# Patient Record
Sex: FEMALE | Race: WHITE | NOT HISPANIC OR LATINO | Employment: OTHER | URBAN - METROPOLITAN AREA
[De-identification: names, ages, dates, MRNs, and addresses within clinical notes are randomized per-mention and may not be internally consistent; named-entity substitution may affect disease eponyms.]

---

## 2017-04-10 ENCOUNTER — HISTORICAL (OUTPATIENT)
Dept: LAB | Facility: HOSPITAL | Age: 77
End: 2017-04-10

## 2017-05-15 ENCOUNTER — HISTORICAL (OUTPATIENT)
Dept: LAB | Facility: HOSPITAL | Age: 77
End: 2017-05-15

## 2017-05-15 LAB
ALBUMIN SERPL-MCNC: 3.4 GM/DL (ref 3.4–5)
ALBUMIN/GLOB SERPL: 1 RATIO (ref 1.1–2)
ALP SERPL-CCNC: 100 UNIT/L (ref 50–136)
ALT SERPL-CCNC: 20 UNIT/L (ref 12–78)
AST SERPL-CCNC: 15 UNIT/L (ref 10–37)
BILIRUB SERPL-MCNC: 0.5 MG/DL (ref 0.2–1)
BILIRUBIN DIRECT+TOT PNL SERPL-MCNC: 0.18 MG/DL (ref 0.05–0.2)
BILIRUBIN DIRECT+TOT PNL SERPL-MCNC: 0.32 MG/DL
BUN SERPL-MCNC: 28 MG/DL (ref 7–18)
CALCIUM SERPL-MCNC: 8.7 MG/DL (ref 8.5–10.1)
CHLORIDE SERPL-SCNC: 105 MMOL/L (ref 98–107)
CO2 SERPL-SCNC: 31.7 MMOL/L (ref 21–32)
CREAT SERPL-MCNC: 0.97 MG/DL (ref 0.55–1.02)
GLOBULIN SER-MCNC: 3.4 GM/DL (ref 2.4–3.5)
GLUCOSE SERPL-MCNC: 111 MG/DL (ref 74–106)
POTASSIUM SERPL-SCNC: 3.4 MMOL/L (ref 3.5–5.1)
PROT SERPL-MCNC: 6.8 GM/DL (ref 6.4–8.2)
SODIUM SERPL-SCNC: 143 MMOL/L (ref 136–145)

## 2017-11-13 ENCOUNTER — HISTORICAL (OUTPATIENT)
Dept: LAB | Facility: HOSPITAL | Age: 77
End: 2017-11-13

## 2017-12-21 ENCOUNTER — HISTORICAL (OUTPATIENT)
Dept: LAB | Facility: HOSPITAL | Age: 77
End: 2017-12-21

## 2018-04-11 ENCOUNTER — HISTORICAL (OUTPATIENT)
Dept: ADMINISTRATIVE | Facility: HOSPITAL | Age: 78
End: 2018-04-11

## 2018-05-07 ENCOUNTER — HISTORICAL (OUTPATIENT)
Dept: LAB | Facility: HOSPITAL | Age: 78
End: 2018-05-07

## 2018-10-03 ENCOUNTER — HISTORICAL (OUTPATIENT)
Dept: ADMINISTRATIVE | Facility: HOSPITAL | Age: 78
End: 2018-10-03

## 2018-11-12 ENCOUNTER — HISTORICAL (OUTPATIENT)
Dept: LAB | Facility: HOSPITAL | Age: 78
End: 2018-11-12

## 2019-05-10 ENCOUNTER — HISTORICAL (OUTPATIENT)
Dept: RADIOLOGY | Facility: HOSPITAL | Age: 79
End: 2019-05-10

## 2019-05-31 ENCOUNTER — HISTORICAL (OUTPATIENT)
Dept: LAB | Facility: HOSPITAL | Age: 79
End: 2019-05-31

## 2019-06-17 ENCOUNTER — HISTORICAL (OUTPATIENT)
Dept: RADIOLOGY | Facility: HOSPITAL | Age: 79
End: 2019-06-17

## 2019-11-21 ENCOUNTER — HISTORICAL (OUTPATIENT)
Dept: LAB | Facility: HOSPITAL | Age: 79
End: 2019-11-21

## 2019-12-02 ENCOUNTER — HISTORICAL (OUTPATIENT)
Dept: LAB | Facility: HOSPITAL | Age: 79
End: 2019-12-02

## 2019-12-10 ENCOUNTER — HISTORICAL (OUTPATIENT)
Dept: RADIOLOGY | Facility: HOSPITAL | Age: 79
End: 2019-12-10

## 2020-03-04 ENCOUNTER — HISTORICAL (OUTPATIENT)
Dept: LAB | Facility: HOSPITAL | Age: 80
End: 2020-03-04

## 2020-03-17 ENCOUNTER — HISTORICAL (OUTPATIENT)
Dept: LAB | Facility: HOSPITAL | Age: 80
End: 2020-03-17

## 2020-07-06 ENCOUNTER — HISTORICAL (OUTPATIENT)
Dept: LAB | Facility: HOSPITAL | Age: 80
End: 2020-07-06

## 2020-09-08 ENCOUNTER — HISTORICAL (OUTPATIENT)
Dept: LAB | Facility: HOSPITAL | Age: 80
End: 2020-09-08

## 2021-02-24 ENCOUNTER — HISTORICAL (OUTPATIENT)
Dept: ADMINISTRATIVE | Facility: HOSPITAL | Age: 81
End: 2021-02-24

## 2021-03-02 ENCOUNTER — HISTORICAL (OUTPATIENT)
Dept: OCCUPATIONAL THERAPY | Facility: HOSPITAL | Age: 81
End: 2021-03-02

## 2021-03-03 ENCOUNTER — HISTORICAL (OUTPATIENT)
Dept: RADIOLOGY | Facility: HOSPITAL | Age: 81
End: 2021-03-03

## 2021-03-04 ENCOUNTER — HISTORICAL (OUTPATIENT)
Dept: OCCUPATIONAL THERAPY | Facility: HOSPITAL | Age: 81
End: 2021-03-04

## 2021-03-10 ENCOUNTER — HISTORICAL (OUTPATIENT)
Dept: OCCUPATIONAL THERAPY | Facility: HOSPITAL | Age: 81
End: 2021-03-10

## 2021-03-12 ENCOUNTER — HISTORICAL (OUTPATIENT)
Dept: OCCUPATIONAL THERAPY | Facility: HOSPITAL | Age: 81
End: 2021-03-12

## 2021-06-16 ENCOUNTER — HISTORICAL (OUTPATIENT)
Dept: ADMINISTRATIVE | Facility: HOSPITAL | Age: 81
End: 2021-06-16

## 2021-07-06 ENCOUNTER — HISTORICAL (OUTPATIENT)
Dept: OCCUPATIONAL THERAPY | Facility: HOSPITAL | Age: 81
End: 2021-07-06

## 2021-07-08 ENCOUNTER — HISTORICAL (OUTPATIENT)
Dept: OCCUPATIONAL THERAPY | Facility: HOSPITAL | Age: 81
End: 2021-07-08

## 2021-07-12 ENCOUNTER — HISTORICAL (OUTPATIENT)
Dept: OCCUPATIONAL THERAPY | Facility: HOSPITAL | Age: 81
End: 2021-07-12

## 2021-07-14 ENCOUNTER — HISTORICAL (OUTPATIENT)
Dept: OCCUPATIONAL THERAPY | Facility: HOSPITAL | Age: 81
End: 2021-07-14

## 2021-07-16 ENCOUNTER — HISTORICAL (OUTPATIENT)
Dept: OCCUPATIONAL THERAPY | Facility: HOSPITAL | Age: 81
End: 2021-07-16

## 2021-07-16 ENCOUNTER — HISTORICAL (OUTPATIENT)
Dept: ADMINISTRATIVE | Facility: HOSPITAL | Age: 81
End: 2021-07-16

## 2021-07-19 ENCOUNTER — HISTORICAL (OUTPATIENT)
Dept: OCCUPATIONAL THERAPY | Facility: HOSPITAL | Age: 81
End: 2021-07-19

## 2021-07-21 ENCOUNTER — HISTORICAL (OUTPATIENT)
Dept: OCCUPATIONAL THERAPY | Facility: HOSPITAL | Age: 81
End: 2021-07-21

## 2021-07-23 ENCOUNTER — HISTORICAL (OUTPATIENT)
Dept: OCCUPATIONAL THERAPY | Facility: HOSPITAL | Age: 81
End: 2021-07-23

## 2021-07-26 ENCOUNTER — HISTORICAL (OUTPATIENT)
Dept: OCCUPATIONAL THERAPY | Facility: HOSPITAL | Age: 81
End: 2021-07-26

## 2021-07-28 ENCOUNTER — HISTORICAL (OUTPATIENT)
Dept: OCCUPATIONAL THERAPY | Facility: HOSPITAL | Age: 81
End: 2021-07-28

## 2021-07-30 ENCOUNTER — HISTORICAL (OUTPATIENT)
Dept: OCCUPATIONAL THERAPY | Facility: HOSPITAL | Age: 81
End: 2021-07-30

## 2021-08-02 ENCOUNTER — HISTORICAL (OUTPATIENT)
Dept: OCCUPATIONAL THERAPY | Facility: HOSPITAL | Age: 81
End: 2021-08-02

## 2021-08-04 ENCOUNTER — HISTORICAL (OUTPATIENT)
Dept: OCCUPATIONAL THERAPY | Facility: HOSPITAL | Age: 81
End: 2021-08-04

## 2021-08-06 ENCOUNTER — HISTORICAL (OUTPATIENT)
Dept: OCCUPATIONAL THERAPY | Facility: HOSPITAL | Age: 81
End: 2021-08-06

## 2021-08-09 ENCOUNTER — HISTORICAL (OUTPATIENT)
Dept: OCCUPATIONAL THERAPY | Facility: HOSPITAL | Age: 81
End: 2021-08-09

## 2021-08-11 ENCOUNTER — HISTORICAL (OUTPATIENT)
Dept: OCCUPATIONAL THERAPY | Facility: HOSPITAL | Age: 81
End: 2021-08-11

## 2021-08-13 ENCOUNTER — HISTORICAL (OUTPATIENT)
Dept: OCCUPATIONAL THERAPY | Facility: HOSPITAL | Age: 81
End: 2021-08-13

## 2021-08-16 ENCOUNTER — HISTORICAL (OUTPATIENT)
Dept: OCCUPATIONAL THERAPY | Facility: HOSPITAL | Age: 81
End: 2021-08-16

## 2021-08-17 ENCOUNTER — HISTORICAL (OUTPATIENT)
Dept: LAB | Facility: HOSPITAL | Age: 81
End: 2021-08-17

## 2021-08-18 ENCOUNTER — HISTORICAL (OUTPATIENT)
Dept: OCCUPATIONAL THERAPY | Facility: HOSPITAL | Age: 81
End: 2021-08-18

## 2021-08-20 ENCOUNTER — HISTORICAL (OUTPATIENT)
Dept: OCCUPATIONAL THERAPY | Facility: HOSPITAL | Age: 81
End: 2021-08-20

## 2021-08-20 LAB — FINAL CULTURE: NO GROWTH

## 2021-08-23 ENCOUNTER — HISTORICAL (OUTPATIENT)
Dept: OCCUPATIONAL THERAPY | Facility: HOSPITAL | Age: 81
End: 2021-08-23

## 2021-08-25 ENCOUNTER — HISTORICAL (OUTPATIENT)
Dept: OCCUPATIONAL THERAPY | Facility: HOSPITAL | Age: 81
End: 2021-08-25

## 2022-02-01 ENCOUNTER — HISTORICAL (OUTPATIENT)
Dept: LAB | Facility: HOSPITAL | Age: 82
End: 2022-02-01

## 2022-02-01 LAB
APPEARANCE, UA: NORMAL
BACTERIA SPEC CULT: NORMAL
BILIRUB UR QL STRIP: NEGATIVE
COLOR UR: YELLOW
GLUCOSE (UA): NEGATIVE
HGB UR QL STRIP: NORMAL
KETONES UR QL STRIP: NEGATIVE
LEUKOCYTE ESTERASE UR QL STRIP: NORMAL
NITRITE UR QL STRIP: POSITIVE
PH UR STRIP: 7.5 [PH] (ref 4.6–8)
PROT UR QL STRIP: NORMAL
RBC #/AREA URNS HPF: NORMAL /[HPF]
SP GR UR STRIP: 1.02 (ref 1–1.03)
SQUAMOUS EPITHELIAL, UA: NORMAL
UROBILINOGEN UR STRIP-ACNC: 0.2
WBC #/AREA URNS HPF: NORMAL /[HPF] (ref 0–3)

## 2022-03-08 ENCOUNTER — HISTORICAL (OUTPATIENT)
Dept: ADMINISTRATIVE | Facility: HOSPITAL | Age: 82
End: 2022-03-08

## 2022-03-08 ENCOUNTER — HISTORICAL (OUTPATIENT)
Dept: RADIOLOGY | Facility: HOSPITAL | Age: 82
End: 2022-03-08

## 2022-04-09 ENCOUNTER — HISTORICAL (OUTPATIENT)
Dept: ADMINISTRATIVE | Facility: HOSPITAL | Age: 82
End: 2022-04-09
Payer: MEDICARE

## 2022-04-25 VITALS
WEIGHT: 205 LBS | OXYGEN SATURATION: 95 % | SYSTOLIC BLOOD PRESSURE: 128 MMHG | BODY MASS INDEX: 30.36 KG/M2 | HEIGHT: 69 IN | DIASTOLIC BLOOD PRESSURE: 78 MMHG

## 2022-05-02 NOTE — HISTORICAL [facility]ER
This is a historical note converted from Becky. Formatting and pictures may have been removed.  Please reference Cerfernando for original formatting and attached multimedia. Chief Complaint  Pt is here for a follow up on left knee pain. Pt c/o right hip pain. Pt stated the pain has been going on fro a few months. Pt stated she takes tylenol and Ibuprofen for pain. She stated she has been doing sari for a few months.  History of Present Illness  Laura comes in today for evaluation of her left knee osteoarthritis and right hip pain..?She said?prior cortisone injections in both knees which have helped. Her last left knee injection was 6 months ago. She is developing some pain and swelling to the?posterior aspect of her knee with occasional lateral pain.?She is also complaining of lateral sided right hip pain. She denies any groin pain.?This hip pain is only been going on for the last month as she feels she is over compensating for the left side.?She denies any history of injuries.?She takes Tylenol and ibuprofen which helped.  Review of Systems  Systemic: No fever, no chills, and no recent weight change.  Head: No headache - frequent.  Eyes: No vision problems.  Otolarnygeal: No hearing loss, no earache, no epistaxis, no hoarseness, and no tooth pain. Gums normal.  Cardiovascular: No chest pain or discomfort and no palpitations.  Pulmonary: No pulmonary symptoms - no dyspnea, no shortness of breath  Gastrointestinal: Appetite not decreased. No dysphagia and no constant eructation. No nausea, no vomiting, no abdominal pain, no hematochezia.  Genitourinary: No genitourinary symptoms - No urinary hesitancy. No urinary loss of control - no burning sensation during urination.  Musculoskeletal: No calf muscle cramps and no localized soft tissue swelling  Neurological: No fainting and no convulsions.  Psychological: no depression.  Skin: No rash.  Physical Exam  Vitals & Measurements  BP:?122/60?  HT:?176?cm? HT:?176?cm?  WT:?92.98?kg? WT:?92.98?kg? BMI:?30.02?  PHYSICAL FINDINGS  Cardiovascular:  Arterial Pulses: Posterior tibialis pulses were normal Left. Dorsalis pedis pulses were normal Left.  Musculoskeletal System:  Thigh:  Left Thigh: Thigh showed quadriceps atrophy.  Knee:  Left Knee: Examined.  Right Knee:  Grade in the knee: Value  Grade effusion 1  Genu varum. Patella demonstrated crepitus. Anteromedial aspect was tender on palpation. Medial aspect was tender on palpation. Medial collateral ligament was tender on palpation. Active motion.  Right Knee:  Knee Motion: Value?  Active flexion 120 degrees  Active extension 5 degrees  Pain was elicited by flexion. No erythema. No warmth. No medial instability. No lateral instability. No one plane medial (straight) instability. No one plane lateral (straight) instability. A Lachman test did not demonstrate one plane anterior instability.  Neurological:  Gait And Stance: A Left-sided antalgic gait was observed.  ?  After verbal consent left knee was prepped in sterile fashion. 2 mL of lidocaine and 2 mL of betamethasone was injected intra-articularly on a 25-gauge needle. Patient tolerated the procedure well  ?  ?  ?  right hip exam  No signs of effusion, erythema, increased heat  She does have tenderness to palpation of the greater trochanteric bursa but no groin tenderness  She has active?hip flexion 100?  Active hip internal rotation 10?  Active hip external rotation 40?  Active hip abduction 30?  No pain with internal or external rotation of the right hip  Good resistance against abductor?strength  Sensation intact distally  Pedal pulses 2+  ?  Right hip radiographs taken office today show very mild joint space narrowing?with minor marginal osteophytes  ?  Assessment/Plan  1.?Primary osteoarthritis of left knee  ? recommend cortisone injection to the left knee today, continued Tylenol versus ibuprofen, home exercises and ice as needed for pain.  Ordered:  betamethasone, 12 mg,  Intra-Articular, Once, first dose 10/03/18 11:00:00 CDT, stop date 10/03/18 11:00:00 CDT  Lidocaine inj., 2 mL, Intra-Articular, Once, first dose 10/03/18 10:13:00 CDT, stop date 10/03/18 10:13:00 CDT  asp/inj jnt/bursa, major  PC, 10/03/18 10:13:00 CDT, [facility], Routine, 10/03/18 10:13:00 CDT  ?  2.?Primary osteoarthritis of right hip  ? home exercise program.?If her lateral sided?right hip pain persist and we discussed a?trochanteric bursa injection in the future.  ?  3.?Trochanteric bursitis, right hip  ?  Orders:  Clinic Follow-up PRN, 10/03/18 10:13:00 CDT, Future Order, [facility]   Problem List/Past Medical History  Ongoing  Hyperlipidemia  Morbid obesity  Overactive bladder  Primary osteoarthritis of left knee  Primary osteoarthritis of right hip  Trochanteric bursitis, right hip  Historical  No qualifying data  Procedure/Surgical History  Appendectomy  Bladder operation   section  Hysterectomy  ORIF - Open reduction and internal fixation of fracture  Tonsillectomy   Medications  amlodipine 10 mg oral tablet, 10 mg= 1 tab(s), Oral, Daily  Aspirin Low Dose 81 mg oral delayed release tablet, 81 mg= 1 tab(s), Oral, Daily  Celestone, 12 mg, Intra-Articular, Once  hydrochlorothiazide-triamterene 25 mg-50 mg oral capsule, 1 cap(s), Oral, Daily  lidocaine 2% injectable solution, 2 mL, Intra-Articular, Once  lovastatin 40 mg oral tablet, 40 mg= 1 tab(s), Oral, Daily, 3 refills  meloxicam 7.5 mg oral tablet, 7.5 mg= 1 tab(s), Oral, Daily, 3 refills  oxybutynin 5 mg oral tablet, 5 mg= 1 tab(s), Oral, BID, 3 refills  Allergies  No Known Allergies  Social History  Alcohol  Current, Wine, 1-2 times per week, 2018  Employment/School  Retired, 2018  Home/Environment  Lives with Alone., 2018  Tobacco  Never smoker, 2016  Family History  Family history is negative  Immunizations  Vaccine Date Status   influenza virus vaccine, inactivated 2016 [place]   Health  Maintenance  Health Maintenance  ???Pending?(in the next year)  ??? ??OverDue  ??? ? ? ?Bone Density Screening due??05/09/18??and every 2??year(s)  ??? ??Due?  ??? ? ? ?ADL Screening due??10/03/18??and every 1??year(s)  ??? ? ? ?Advance Directive due??10/03/18??and every 1??year(s)  ??? ? ? ?Cognitive Screening due??10/03/18??and every 1??year(s)  ??? ? ? ?Functional Assessment due??10/03/18??and every 1??year(s)  ??? ? ? ?Geriatric Depression Screening due??10/03/18??and every 1??year(s)  ??? ? ? ?Pneumococcal Vaccine due??10/03/18??Variable frequency  ??? ? ? ?Pneumococcal Vaccine due??10/03/18??and every?  ??? ? ? ?Tetanus Vaccine due??10/03/18??and every 10??year(s)  ??? ? ? ?Zoster Vaccine due??10/03/18??and every 100??year(s)  ??? ??Due In Future?  ??? ? ? ?Hypertension Management-BMP not due until??07/13/19??and every 1??year(s)  ???Satisfied?(in the past 1 year)  ??? ??Satisfied?  ??? ? ? ?Blood Pressure Screening on??10/03/18.??Satisfied by Libby Rader LPN  ??? ? ? ?Body Mass Index Check on??10/03/18.??Satisfied by Libby Rader LPN  ??? ? ? ?Depression Screening on??10/03/18.??Satisfied by Libby Rader LPN  ??? ? ? ?Diabetes Screening on??05/07/18.??Satisfied by Tonya Hancock MD  ??? ? ? ?Fall Risk Assessment on??10/03/18.??Satisfied by Libby Rader LPN  ??? ? ? ?Hypertension Management-Blood Pressure on??10/03/18.??Satisfied by Libby Rader LPN  ??? ? ? ?Influenza Vaccine on??10/03/18.??Satisfied by Libby Rader LPN  ??? ? ? ?Lipid Screening on??05/07/18.??Satisfied by Tonya Hancock MD  ??? ? ? ?Obesity Screening on??10/03/18.??Satisfied by Libby Rader LPN  ?  ?

## 2022-05-02 NOTE — HISTORICAL [facility]
This is a historical note converted from Becky. Formatting and pictures may have been removed.  Please reference Cerfernando for original formatting and attached multimedia. Chief Complaint  left knee pain. pt states she is having trouble getting in and out of her vehicle. pain has been there for awhile.  History of Present Illness  80-year-old female presents office today for evaluation of her left knee pain. ?She does have a known history of osteoarthritis.? She has received cortisone injections in the past which have helped. ?Her pain is medial sided and?worse with getting from a seated to a standing position. ?She does admit to some swelling. ?Denies any history of injury. ?She does have a history of chronic kidney disease stage III. ?Followed by?medicine.? She refrains from taking NSAIDs.  Review of Systems  Systemic: No fever, no chills, and no recent weight change.  Head: No headache - frequent.  Eyes: No vision problems.  Otolarnygeal: No hearing loss, no earache, no epistaxis, no hoarseness, and no tooth pain. Gums normal.  Cardiovascular: No chest pain or discomfort and no palpitations.  Pulmonary: No pulmonary symptoms - no dyspnea, no shortness of breath  Gastrointestinal: Appetite not decreased. No dysphagia and no constant eructation. No nausea, no vomiting, no abdominal pain, no hematochezia.  Genitourinary: No genitourinary symptoms - No urinary hesitancy. No urinary loss of control - no burning sensation during urination.  Musculoskeletal: No calf muscle cramps and no localized soft tissue swelling  Neurological: No fainting and no convulsions.  Psychological: no depression.  Skin: No rash.  Physical Exam  Vitals & Measurements  T:?97.6? ?F (Oral)? BP:?126/69?  HT:?175.00?cm? WT:?90.800?kg? BMI:?29.65?  PHYSICAL FINDINGS  Cardiovascular:  Arterial Pulses: Posterior tibialis pulses were normal. Dorsalis pedis pulses were normal left.  Musculoskeletal System:  Thigh:  ?Thigh: Thigh showed quadriceps  atrophy.  Knee:  left?Knee:  Grade effusion 1  Genu varum. Patella demonstrated crepitus. Anteromedial aspect was tender on palpation. Medial aspect was tender on palpation. Medial collateral ligament was tender on palpation. Active motion.  left?Knee:  Knee Motion: Value  Active flexion?120 degrees  Active extension?10 degrees  Pain was elicited by flexion. No erythema. No warmth. No medial instability. No lateral instability. No one plane medial (straight) instability. No one plane lateral (straight) instability. A Lachman test did not demonstrate one plane anterior instability.  Neurological:  Gait And Stance: A left-sided antalgic gait was observed.  ???  ???  TESTS  Imaging:  X-Ray Knee:  A complete knee x-ray with standing views was performed -of?left knee.  AP and lateral view x-rays of the Right knee with sunrise view of the patella were performed -of?left knee.  ???  ???  IMPRESSIONS RADIOLOGY TEST  Advanced Narrowing of the joint space? in medial and patellofemoral compartments, and osteophytes arising from the?left knee.  Kellgren Brayan grade 4 changes  ?   After verbal consent?left knee was prepped in sterile fashion. 2 mL of lidocaine and 2 mL of betamethasone was injected intra-articularly on a 25-gauge needle. Patient tolerated the procedure well.  Assessment/Plan  1.?Primary osteoarthritis of left knee?M17.12  ?Patient would like to try corticosteroid injection?again today?and we will initiate physical therapy program. ?We have discussed robotic assisted left total knee arthroplasty?which she is seriously contemplating in the next couple of months.? If she wishes to proceed on with the surgery then we will?request medical clearance?prior. ?She will follow-up with us as needed  Ordered:  betamethasone, 12 mg, Intra-Articular, Once, first dose 02/24/21 9:00:00 CST, stop date 02/24/21 9:00:00 CST  Lidocaine inj., 2 mL, Intra-Articular, Once, first dose 02/24/21 8:29:00 CST, stop date 02/24/21 8:29:00  CST  asp/inj jnt/bursa, major  PC, 21 8:29:00 CST, [facility], Routine, 21 8:29:00 CST, Primary osteoarthritis of left knee  Clinic Follow-up PRN, 21 8:29:00 CST, Future Order, [facility]  ?  2.?CKD (chronic kidney disease) stage 3, GFR 30-59 ml/min?N18.3  ?  Referrals  Clinic Follow-up PRN, 21 8:29:00 CST, Future Order, [facility]   Problem List/Past Medical History  Ongoing  CKD (chronic kidney disease) stage 3, GFR 30-59 ml/min  Coronary arteriosclerosis  HTN (hypertension)  Hyperlipidemia  Osteopenia  Overactive bladder  Personal history of breast cancer  Prediabetes  Primary osteoarthritis of left knee  Primary osteoarthritis of right hip  Trochanteric bursitis, right hip  Historical  Morbid obesity  Procedure/Surgical History  Mammogram (12/10/2019)  Mammogram (Week of 2018)  Colonoscopy (2017)  Open reduction of fracture of radius with internal fixation ()  Suspension of bladder ()  Appendectomy ()  Hysterectomy ()   section ()   section ()  Tonsillectomy ()   Medications  amlodipine 5 mg oral tablet, See Instructions  Aspirin Low Dose 81 mg oral delayed release tablet, 81 mg= 1 tab(s), Oral, Daily  Celestone, 12 mg, Intra-Articular, Once  Centrum Silver oral tablet, 1 tab(s), Oral, Daily  Elderberry, Zinc, Vit C, Vit D capsule  hydrochlorothiazide-triamterene 25 mg-37.5 mg oral tablet, See Instructions  lidocaine 2% injectable solution, 2 mL, Intra-Articular, Once  lovastatin 40 mg oral tablet, See Instructions  nitrofurantoin macrocrystals 50 mg oral capsule, 50 mg= 1 cap(s), Oral, Daily  Osteo Bi-Flex  tamsulosin 0.4 mg oral capsule, 0.4 mg= 1 cap(s), Oral, Daily  Allergies  No Known Allergies  Social History  Abuse/Neglect  No, No, Yes, 2021  Alcohol  Never, 2019  Employment/School  Retired, 2018  Exercise  Exercise duration: 0., 2019  Financial/Legal Situation  None,  09/11/2020  Home/Environment  Lives with Alone., 04/11/2018    Never in , 09/11/2020  Nutrition/Health  Regular, 11/05/2019  Sexual  Sexually active: No., 11/05/2019  Spiritual/Cultural  Religious, 09/11/2020  Substance Use  Never, 11/05/2019  Tobacco  Never (less than 100 in lifetime), N/A, Smokeless Tobacco Use: Never., 02/24/2021  Family History  Hypertension.: Mother.  Primary malignant neoplasm of breast: Mother.  Immunizations  Vaccine Date Status   pneumococcal 23-valent vaccine 10/14/2020 Recorded   influenza virus vaccine, inactivated 10/14/2020 Recorded   pneumococcal 13-valent conjugate vaccine 10/22/2019 Recorded   influenza virus vaccine, inactivated 10/21/2019 Recorded   pneumococcal 13-valent conjugate vaccine 10/21/2019 Recorded   influenza virus vaccine, inactivated 10/10/2018 Recorded   influenza virus vaccine, inactivated 11/30/2017 Recorded   influenza virus vaccine, inactivated 09/23/2016 Given   influenza virus vaccine, inactivated 11/16/2015 Recorded   influenza virus vaccine, inactivated 09/28/2012 Recorded   tetanus-diphtheria toxoids 2012 Recorded   influenza virus vaccine, inactivated 09/07/2011 Recorded   influenza virus vaccine, inactivated 10/23/2009 Recorded   influenza virus vaccine, inactivated 11/05/2007 Recorded   influenza virus vaccine, inactivated 12/11/2006 Recorded   hepatitis A adult vaccine 12/11/2006 Recorded   influenza virus vaccine, inactivated 10/16/2005 Recorded   hepatitis A adult vaccine 10/15/2005 Recorded   influenza virus vaccine, inactivated 10/25/2004 Recorded   influenza virus vaccine, inactivated 11/03/2003 Recorded   influenza virus vaccine, inactivated 11/13/2002 Recorded   Health Maintenance  Health Maintenance  ???Pending?(in the next year)  ??? ??OverDue  ??? ? ? ?Cognitive Screening due??01/02/21??and every 1??year(s)  ??? ??Due?  ??? ? ? ?Zoster Vaccine due??02/24/21??Unknown Frequency  ??? ??Due In Future?  ??? ? ? ?Diabetes Screening  not due until??09/08/21??and every 1??year(s)  ??? ? ? ?Hypertension Management-BMP not due until??09/08/21??and every 1??year(s)  ??? ? ? ?Hypertension Management-Education not due until??09/11/21??and every 1??year(s)  ??? ? ? ?Influenza Vaccine not due until??10/01/21??and every 1??day(s)  ??? ? ? ?ADL Screening not due until??11/13/21??and every 1??year(s)  ??? ? ? ?Obesity Screening not due until??01/01/22??and every 1??year(s)  ??? ? ? ?Tetanus Vaccine not due until??01/01/22??and every 10??year(s)  ??? ? ? ?Advance Directive not due until??01/02/22??and every 1??year(s)  ??? ? ? ?Fall Risk Assessment not due until??01/02/22??and every 1??year(s)  ??? ? ? ?Functional Assessment not due until??01/02/22??and every 1??year(s)  ??? ? ? ?Medicare Annual Wellness Exam not due until??01/21/22??and every 1??year(s)  ???Satisfied?(in the past 1 year)  ??? ??Satisfied?  ??? ? ? ?ADL Screening on??11/13/20.??Satisfied by Roxy Laboy  ??? ? ? ?Advance Directive on??01/21/21.??Satisfied by Roxy Laboy  ??? ? ? ?Blood Pressure Screening on??02/24/21.??Satisfied by Latha Baig  ??? ? ? ?Body Mass Index Check on??02/24/21.??Satisfied by Latha Baig  ??? ? ? ?Coronary Artery Disease Maintenance-Lipid Lowering Therapy on??12/14/20.??Satisfied by Beverly FUENTES, Latasha NO  ??? ? ? ?Depression Screening on??02/24/21.??Satisfied by Latha Baig  ??? ? ? ?Diabetes Screening on??09/08/20.??Satisfied by Imelda Van  ??? ? ? ?Fall Risk Assessment on??02/24/21.??Satisfied by Latha Baig  ??? ? ? ?Functional Assessment on??02/24/21.??Satisfied by Latha Baig  ??? ? ? ?Hypertension Management-Blood Pressure on??02/24/21.??Satisfied by Latha Baig  ??? ? ? ?Influenza Vaccine on??10/14/20.??Satisfied by Roxy Laboy  ??? ? ? ?Lipid Screening on??09/08/20.??Satisfied by Imelda Van  ??? ? ? ?Medicare Annual Wellness Exam on??01/21/21.??Satisfied by Beverly FUENTES, Latasha NO  ??? ? ?  ?Obesity Screening on??02/24/21.??Satisfied by Latha Baig  ??? ? ? ?Pneumococcal Vaccine on??10/14/20.??Satisfied by Roxy Laboy  ??? ??Refused?  ??? ? ? ?Zoster Vaccine on??01/21/21.??Recorded by Beverly FUENTES, Latasha NO??Reason: Patient Refuses  ?

## 2022-05-02 NOTE — HISTORICAL [facility]
This is a historical note converted from [place]. Formatting and pictures may have been removed.  Please reference [place] for original formatting and attached multimedia. Chief Complaint  PT C/O BILATERAL KNEE PAIN. PT STATED THE LEFT KNEE HURTS MORE THAN THE RIGHT. PT IS INTERESTED IN AN INJECTION.  History of Present Illness  77-year-old female that presents office today for bilateral knee pain.?She states this has gone on for?3-4 weeks.?She states left is worse than right.?Her pain is localized to the medial aspect of the knee.?Since her?initial pain the pain has gotten better. She denies any injury. She denies any swelling episodes. This is worse with bending?and coming from a seated to a standing position.  Review of Systems  Systemic: No fever, no chills, and no recent weight change.  Head: No headache - frequent.  Eyes: No vision problems.  Otolarnygeal: No hearing loss, no earache, no epistaxis, no hoarseness, and no tooth pain. Gums normal.  Cardiovascular: No chest pain or discomfort and no palpitations.  Pulmonary: No pulmonary symptoms - difficulty sleeping, no dyspnea, and cough not worse in the morning.  Gastrointestinal: Appetite not decreased. No dysphagia and no constant eructation. No nausea, no vomiting, no abdominal pain, no hematochezia, and no loose/mushy stools - frequent. No constipation - frequent.  Genitourinary: No genitourinary symptoms - Getting up every night to urinate and no increase in urinary frequency. No urinary hesitancy. No urinary loss of control - difficulty stopping urination and no burning sensation during urination.  Musculoskeletal: No calf muscle cramps and no localized soft tissue swelling of the ankle.  Neurological: No fainting and no convulsions.  Psychological: Not feeling nervous tension, not feeling nervous from exhaustion, and no depression.  Skin: No rash. Previous history of no ulcers.  ?  ?  Physical Exam  Vitals & Measurements  BP:?118/78?  HT:?176?cm?  HT:?176?cm? WT:?92.98?kg? WT:?92.98?kg? BMI:?30.02?  PHYSICAL FINDINGS  Cardiovascular:  Arterial Pulses: Posterior tibialis pulses were normal Left. Dorsalis pedis pulses were normal Left.  Musculoskeletal System:  Thigh:  Left Thigh: Thigh showed quadriceps atrophy.  Knee:  Left Knee: Examined.  Right Knee:  Grade in the knee: Value  Grade effusion 1  Genu varum. Patella demonstrated crepitus. Anteromedial aspect was tender on palpation. Medial aspect was tender on palpation. Medial collateral ligament was tender on palpation. Active motion.  Right Knee:  Knee Motion: Value?  Active flexion 120 degrees  Active extension 5 degrees  Pain was elicited by flexion. No erythema. No warmth. No medial instability. No lateral instability. No one plane medial (straight) instability. No one plane lateral (straight) instability. A Lachman test did not demonstrate one plane anterior instability.  Neurological:  Gait And Stance: A Left-sided antalgic gait was observed.  ?  ?  TESTS  Imaging:  X-Ray Knee:  A complete knee x-ray with standing views was performed -of Left knee.  AP and lateral view x-rays of the Left knee with sunrise view of the patella were performed -of Left knee.  ?  ?  IMPRESSIONS RADIOLOGY TEST  Narrowing of the Left knee joint space , of the Left knee joint space, and osteophytes arising from the Left knee.  ?  PHYSICAL FINDINGS  Cardiovascular:  Arterial Pulses: Posterior tibialis pulses were normal Right. Dorsalis pedis pulses were normal Right.  Musculoskeletal System:  Thigh:  Right Thigh: Thigh showed quadriceps atrophy.  Knee:  Right Knee: Examined.  Right Knee:  Grade in the knee: Value  Grade effusion 1  Genu varum. Patella demonstrated crepitus. Anteromedial aspect was tender on palpation. Medial aspect was tender on palpation. Medial collateral ligament was tender on palpation. Active motion.  Right Knee:  Knee Motion: Value?  Active flexion 120 degrees  Active extension 5 degrees  Pain was  elicited by flexion. No erythema. No warmth. No medial instability. No lateral instability. No one plane medial (straight) instability. No one plane lateral (straight) instability. A Lachman test did not demonstrate one plane anterior instability.  Neurological:  Gait And Stance: A Right-sided antalgic gait was observed.  ?  ?  TESTS  Imaging:  X-Ray Knee:  A complete knee x-ray with standing views was performed -of Right knee.  AP and lateral view x-rays of the Right knee with sunrise view of the patella were performed -of Right knee.  ?  ?  IMPRESSIONS RADIOLOGY TEST  Narrowing of the Right knee joint space , of the Right knee joint space, and osteophytes arising from the Right knee.  ?  After verbal consent left knee was prepped in sterile fashion. 2 mL of lidocaine and 2 mL of betamethasone was injected intra-articularly on a 25-gauge needle. Patient tolerated the procedure well  ?  ?  ?  After verbal consent right knee was prepped in sterile fashion. 2 mL of lidocaine and 2 mL of betamethasone was injected intra-articularly on a 25-gauge needle. Patient tolerated the procedure well  ?  ?  ?  Assessment/Plan  1.?Primary osteoarthritis of left knee  ? She has mild to moderate osteoarthritis and recommend conservative treatment consisting of corticosteroid injection home exercise program.?After verbal consent she received a cortisone injection to the right and left knee and tolerated the procedures well. Shell be given home exercises and follow up with us as needed  Ordered:  betamethasone, 24 mg, Intra-Articular, Once, first dose 04/11/18 14:00:00 CDT, stop date 04/11/18 14:00:00 CDT  Lidocaine inj., 4 mL, Intra-Articular, Once, first dose 04/11/18 13:18:00 CDT, stop date 04/11/18 13:18:00 CDT  Asp/Inj (Bilateral) Major Jnt/Bursa [id number], 04/11/18 13:18:00 CDT, [facility], Routine, 04/11/18 13:18:00 CDT  ?  2.?Primary osteoarthritis of right knee  Ordered:  betamethasone, 24 mg, Intra-Articular,  Once, first dose 18 14:00:00 CDT, stop date 18 14:00:00 CDT  Lidocaine inj., 4 mL, Intra-Articular, Once, first dose 18 13:18:00 CDT, stop date 18 13:18:00 CDT  Asp/Inj (Bilateral) Major Jnt/Bursa [id number], 18 13:18:00 CDT, [facility], Routine, 18 13:18:00 CDT  ?  Bilateral knee pain  ?  Orders:  Clinic Follow-up PRN, 18 13:17:00 CDT, Future Order, [facility]   Problem List/Past Medical History  Ongoing  Hyperlipidemia  Morbid obesity  Overactive bladder  Primary osteoarthritis of left knee  Historical  No qualifying data  Procedure/Surgical History  Appendectomy, Bladder operation,  section, Hysterectomy, ORIF - Open reduction and internal fixation of fracture, Tonsillectomy.  Medications  amlodipine 10 mg oral tablet, 10 mg= 1 tab(s), Oral, Daily  Aspirin Low Dose 81 mg oral delayed release tablet, 81 mg= 1 tab(s), Oral, Daily  Celestone, 24 mg, Intra-Articular, Once  hydrochlorothiazide-triamterene 25 mg-50 mg oral capsule, 1 cap(s), Oral, Daily  lidocaine 2% injectable solution, 4 mL, Intra-Articular, Once  lovastatin 40 mg oral tablet, 40 mg= 1 tab(s), Oral, Daily, 3 refills  meloxicam 7.5 mg oral tablet, 7.5 mg= 1 tab(s), Oral, Daily, 3 refills  oxybutynin 5 mg oral tablet, 5 mg= 1 tab(s), Oral, BID, 3 refills  Allergies  No Known Allergies  Social History  Alcohol  Current, Wine, 1-2 times per week, 2018  Employment/School  Retired, 2018  Home/Environment  Lives with Alone., 2018  Tobacco  Never smoker, 2016  Family History  Family history is negative  Immunizations  Vaccine Date Status   influenza virus vaccine, inactivated 2016 Given

## 2022-05-02 NOTE — HISTORICAL [facility]
This is a historical note converted from [employer]. Formatting and pictures may have been removed.  Please reference Becky for original formatting and attached multimedia. Chief Complaint  L. TKR SX 7-1-21. Patient ambulating with a walker.  History of Present Illness  80-year-old female presents office today for evaluation of her left knee. ?She is 2-week status post left total knee arthroplasty and overall doing well. ?She does have some intermittent pain and swelling. ?She is ambulating with a walker and attending physical therapy  Review of Systems  Systemic: No fever, no chills, and no recent weight change.  Head: No headache - frequent.  Eyes: No vision problems.  Otolarnygeal: No hearing loss, no earache, no epistaxis, no hoarseness, and no tooth pain. Gums normal.  Cardiovascular: No chest pain or discomfort and no palpitations.  Pulmonary: No pulmonary symptoms - no dyspnea, no shortness of breath  Gastrointestinal: Appetite not decreased. No dysphagia and no constant eructation. No nausea, no vomiting, no abdominal pain, no hematochezia.  Genitourinary: No genitourinary symptoms - No urinary hesitancy. No urinary loss of control - no burning sensation during urination.  Musculoskeletal: No calf muscle cramps and no localized soft tissue swelling  Neurological: No fainting and no convulsions.  Psychological: no depression.  Skin: No rash.  Physical Exam  Vitals & Measurements  T:?36.2? ?C (Oral)? HR:?89(Peripheral)? BP:?124/88?  HT:?175.00?cm? WT:?91.000?kg? BMI:?29.71?  Musculoskeletal System:  Left?knee:  General/bilateral: ? Swelling of the knee. ? Knee demonstrated muscle weakness. ? No warmth of the knee. ? No pain was elicited by motion of the knee. ? No instability of the knee  Left?knee:  Knee Motion: Value  Active flexion?90 degrees  Active extension 0 degrees  ? ?  Neurological:  Motor (Strength): ? Weakness of the left knee was observed.  Skin:  ? No cellulitis.  Wound healing normal. staples  C/D/I.  ? ?  TESTS  Imaging:  X-Ray Knee:  AP and lateral view x-rays of the?left knee with sunrise view of the patella were performed  ? ?  IMPRESSIONS RADIOLOGY TEST  X-ray of knee was performed intact left knee implant.  Assessment/Plan  Status post total knee replacement, left?Z96.652  ?Staples removed today, Steri-Strips applied. ?Continue with the use of a walker and transition to a cane once comfortable. ?I will refill her tramadol for pain. ?Follow-up in 3 months for repeat evaluation  Ordered:  Clinic Follow up, *Est. 10/16/21 3:00:00 CDT, Order for future visit, Status post total knee replacement, left, [facility]  Post-Op follow-up visit [id number] PC, Status post total knee replacement, left, Orthopa[facility]ics Clinic, 07/16/21 8:55:00 CDT  PT/OT External Referral, 07/16/21 8:56:00 CDT, Status post total knee replacement, left, Anit Grav Hip Abductor & Extensor Exc.  Isotonic hamstring & Closed Chain Quad  Stretch and Strengthen  No Dry Needling  Therapeutic Excercise, 3 X Week, ****KNEE PT ORDERS****  ?  Orders:  traMADol, 50 mg = 1 tab(s), Oral, TID, PRN PRN for pain, X 7 day(s), # 21 tab(s), 0 Refill(s), Pharmacy: [facility]/pharmacy #[id number], 175, cm, Height/Length Dosing, 07/16/21 8:36:00 CDT, 91, kg, Weight Dosing, 07/16/21 8:36:00 CDT  Referrals  Clinic Follow up, *Est. 10/16/21 3:00:00 CDT, Order for future visit, Status post total knee replacement, left, [facility]  PT/OT External Referral, 07/16/21 8:56:00 CDT, Status post total knee replacement, left, Anit Grav Hip Abductor & Extensor Exc.  Isotonic hamstring & Closed Chain Quad  Stretch and Strengthen  No Dry Needling  Therapeutic Excercise, 3 X Week, ****KNEE PT ORDERS****   Problem List/Past Medical History  Ongoing  CKD (chronic kidney disease) stage 3, GFR 30-59 ml/min  Coronary arteriosclerosis  GERD - Gastro-esophageal reflux disease  HTN (hypertension)  Hyperlipidemia  Impaired gait and mobility  Osteopenia  Overactive bladder  Personal  history of breast cancer  Pre-op exam  Prediabetes  Primary osteoarthritis of left knee  Primary osteoarthritis of right hip  Trochanteric bursitis, right hip  Historical  Morbid obesity  Procedure/Surgical History  CLARE GARCÍA Total Knee Arthroplasty (Left) (2021)  Replacement of Left Knee Joint with Synthetic Substitute, Cemented, Open Approach (2021)  Robotic Assisted Procedure of Lower Extremity, Open Approach (2021)  Mammogram (2021)  Colonoscopy (2017)  Open reduction of fracture of radius with internal fixation ()  Suspension of bladder ()  Appendectomy ()  Hysterectomy ()   section ()   section ()  Tonsillectomy ()   Medications  amlodipine 5 mg oral tablet, See Instructions  aspirin 81 mg oral Delayed Release (EC) tablet, 81 mg= 1 tab(s), Oral, BID  Centrum Silver oral tablet, 1 tab(s), Oral, Daily  Elderberry, Zinc, Vit C, Vit D capsule  hydrochlorothiazide-triamterene 25 mg-37.5 mg oral tablet, See Instructions  lovastatin 40 mg oral tablet, See Instructions  Osteo Bi-Flex  polyethylene glycol 3350 oral powder for reconstitution, 17 gm, Oral, Daily  tamsulosin 0.4 mg oral capsule, 0.4 mg= 1 cap(s), Oral, Daily  traMADol 50 mg oral tablet, 50 mg= 1 tab(s), Oral, q4hr  traMADol 50 mg oral tablet, 50 mg= 1 tab(s), Oral, TID, PRN  Allergies  No Known Allergies  Social History  Abuse/Neglect  No, No, Yes, 2021  Alcohol  Never, 2019  Employment/School  Retired, 2018  Exercise  Exercise duration: 0., 2019  Financial/Legal Situation  None, 2020  Home/Environment  Lives with Alone., 2018    Never in , 2020  Nutrition/Health  Regular, 2019  Sexual  Sexually active: No., 2019  Spiritual/Cultural  Faith, 2020  Substance Use  Never, 2019  Tobacco  Never (less than 100 in lifetime), N/A, Smokeless Tobacco Use: Never., 2021  Family History  Acute myocardial  infarction.: Mother.  Breast cancer: Mother.  Hypertension.: Mother, Sister and Sister.  Lung cancer: Father.  Prostate cancer: Brother.  Immunizations  Vaccine Date Status   pneumococcal 23-valent vaccine 10/14/2020 Recorded   influenza virus vaccine, inactivated 10/14/2020 Recorded   pneumococcal 13-valent conjugate vaccine 10/22/2019 Recorded   influenza virus vaccine, inactivated 10/21/2019 Recorded   pneumococcal 13-valent conjugate vaccine 10/21/2019 Recorded   influenza virus vaccine, inactivated 10/10/2018 Recorded   influenza virus vaccine, inactivated 11/30/2017 Recorded   influenza virus vaccine, inactivated 09/23/2016 Given   influenza virus vaccine, inactivated 11/16/2015 Recorded   influenza virus vaccine, inactivated 09/28/2012 Recorded   tetanus-diphtheria toxoids 2012 Recorded   influenza virus vaccine, inactivated 09/07/2011 Recorded   influenza virus vaccine, inactivated 10/23/2009 Recorded   influenza virus vaccine, inactivated 11/05/2007 Recorded   influenza virus vaccine, inactivated 12/11/2006 Recorded   hepatitis A adult vaccine 12/11/2006 Recorded   influenza virus vaccine, inactivated 10/16/2005 Recorded   hepatitis A adult vaccine 10/15/2005 Recorded   influenza virus vaccine, inactivated 10/25/2004 Recorded   influenza virus vaccine, inactivated 11/03/2003 Recorded   influenza virus vaccine, inactivated 11/13/2002 Recorded   Health Maintenance  Health Maintenance  ???Pending?(in the next year)  ??? ??OverDue  ??? ? ? ?Cognitive Screening due??01/02/21??and every 1??year(s)  ??? ??Due?  ??? ? ? ?Bone Density Screening due??06/18/21??and every 2??year(s)  ??? ? ? ?Zoster Vaccine due??07/16/21??Unknown Frequency  ??? ??Due In Future?  ??? ? ? ?Hypertension Management-Education not due until??09/11/21??and every 1??year(s)  ??? ? ? ?Influenza Vaccine not due until??10/01/21??and every 1??day(s)  ??? ? ? ?ADL Screening not due until??11/13/21??and every 1??year(s)  ??? ? ? ?Obesity Screening  not due until??01/01/22??and every 1??year(s)  ??? ? ? ?Tetanus Vaccine not due until??01/01/22??and every 10??year(s)  ??? ? ? ?Advance Directive not due until??01/02/22??and every 1??year(s)  ??? ? ? ?Fall Risk Assessment not due until??01/02/22??and every 1??year(s)  ??? ? ? ?Functional Assessment not due until??01/02/22??and every 1??year(s)  ??? ? ? ?Medicare Annual Wellness Exam not due until??01/21/22??and every 1??year(s)  ??? ? ? ?Diabetes Screening not due until??07/02/22??and every 1??year(s)  ??? ? ? ?Hypertension Management-BMP not due until??07/02/22??and every 1??year(s)  ??? ? ? ?Depression Screening not due until??07/14/22??and every 1??year(s)  ???Satisfied?(in the past 1 year)  ??? ??Satisfied?  ??? ? ? ?ADL Screening on??11/13/20.??Satisfied by Roxy Laboy  ??? ? ? ?Advance Directive on??07/01/21.??Satisfied by Margi Angel RN  ??? ? ? ?Blood Pressure Screening on??07/16/21.??Satisfied by Libby Russo LPN.  ??? ? ? ?Body Mass Index Check on??07/16/21.??Satisfied by Libby Russo LPN.  ??? ? ? ?Breast Cancer Screening on??03/03/21.??Satisfied by Lorna Van  ??? ? ? ?Coronary Artery Disease Maintenance-Antiplatelet Agent Prescribed on??07/02/21.??Satisfied by Vivienne Caballero NP  ??? ? ? ?Depression Screening on??07/14/21.??Satisfied by Roxy Laboy  ??? ? ? ?Diabetes Screening on??07/02/21.??Satisfied by Fei Najera  ??? ? ? ?Fall Risk Assessment on??07/16/21.??Satisfied by Libby Russo LPN  ??? ? ? ?Functional Assessment on??07/02/21.??Satisfied by Cheo JUAN, Kimberly NO  ??? ? ? ?Hypertension Management-Blood Pressure on??07/16/21.??Satisfied by Libby Russo LPN  ??? ? ? ?Influenza Vaccine on??10/14/20.??Satisfied by Roxy Laboy  ??? ? ? ?Lipid Screening on??09/08/20.??Satisfied by Imelda Van  ??? ? ? ?Medicare Annual Wellness Exam on??01/21/21.??Satisfied by Beverly FUENTES, Latasha NO  ??? ? ? ?Obesity Screening  on??07/16/21.??Satisfied by Libby Russo LPN  ??? ? ? ?Pneumococcal Vaccine on??10/14/20.??Satisfied by Roxy Laboy  ??? ??Refused?  ??? ? ? ?Zoster Vaccine on??01/21/21.??Recorded by Beverly FUENTES, Latasha NO??Reason: Patient Refuses  ?

## 2022-05-27 ENCOUNTER — APPOINTMENT (OUTPATIENT)
Dept: LAB | Facility: HOSPITAL | Age: 82
End: 2022-05-27
Attending: UROLOGY
Payer: MEDICARE

## 2022-05-27 DIAGNOSIS — N30.21 OTHER CHRONIC CYSTITIS WITH HEMATURIA: Primary | ICD-10-CM

## 2022-05-27 LAB
APPEARANCE UR: ABNORMAL
BACTERIA #/AREA URNS AUTO: ABNORMAL /HPF
BILIRUB UR QL STRIP.AUTO: NEGATIVE MG/DL
COLOR UR AUTO: ABNORMAL
GLUCOSE UR QL STRIP.AUTO: ABNORMAL MG/DL
KETONES UR QL STRIP.AUTO: NEGATIVE MG/DL
LEUKOCYTE ESTERASE UR QL STRIP.AUTO: ABNORMAL UNIT/L
NITRITE UR QL STRIP.AUTO: POSITIVE
PH UR STRIP.AUTO: 7.5 [PH]
PROT UR QL STRIP.AUTO: ABNORMAL MG/DL
RBC #/AREA URNS AUTO: ABNORMAL /HPF
RBC UR QL AUTO: NEGATIVE UNIT/L
SP GR UR STRIP.AUTO: 1.02
SQUAMOUS #/AREA URNS AUTO: ABNORMAL /LPF
UROBILINOGEN UR STRIP-ACNC: 4 MG/DL
WBC #/AREA URNS AUTO: >100 /HPF

## 2022-05-27 PROCEDURE — 87077 CULTURE AEROBIC IDENTIFY: CPT

## 2022-05-27 PROCEDURE — 81001 URINALYSIS AUTO W/SCOPE: CPT

## 2022-05-29 LAB — BACTERIA UR CULT: ABNORMAL

## 2022-06-13 ENCOUNTER — HOSPITAL ENCOUNTER (OUTPATIENT)
Dept: RADIOLOGY | Facility: CLINIC | Age: 82
Discharge: HOME | End: 2022-06-13
Attending: SPECIALIST

## 2022-06-13 ENCOUNTER — OFFICE VISIT (OUTPATIENT)
Dept: ORTHOPEDICS | Facility: CLINIC | Age: 82
End: 2022-06-13
Payer: MEDICARE

## 2022-06-13 ENCOUNTER — HOSPITAL ENCOUNTER (OUTPATIENT)
Dept: RADIOLOGY | Facility: CLINIC | Age: 82
Discharge: HOME | End: 2022-06-13
Attending: SPECIALIST
Payer: MEDICARE

## 2022-06-13 VITALS
DIASTOLIC BLOOD PRESSURE: 71 MMHG | TEMPERATURE: 98 F | BODY MASS INDEX: 29.32 KG/M2 | HEIGHT: 70 IN | WEIGHT: 204.81 LBS | SYSTOLIC BLOOD PRESSURE: 129 MMHG | HEART RATE: 82 BPM

## 2022-06-13 DIAGNOSIS — M25.562 ACUTE PAIN OF LEFT KNEE: ICD-10-CM

## 2022-06-13 DIAGNOSIS — Z96.652 HISTORY OF TOTAL KNEE REPLACEMENT, LEFT: ICD-10-CM

## 2022-06-13 DIAGNOSIS — M25.551 RIGHT HIP PAIN: Primary | ICD-10-CM

## 2022-06-13 DIAGNOSIS — M51.36 LUMBAR DEGENERATIVE DISC DISEASE: ICD-10-CM

## 2022-06-13 DIAGNOSIS — M54.16 LUMBAR RADICULOPATHY: ICD-10-CM

## 2022-06-13 PROCEDURE — 99213 PR OFFICE/OUTPT VISIT, EST, LEVL III, 20-29 MIN: ICD-10-PCS | Mod: ,,, | Performed by: PHYSICIAN ASSISTANT

## 2022-06-13 PROCEDURE — 73502 X-RAY EXAM HIP UNI 2-3 VIEWS: CPT | Mod: RT,,, | Performed by: SPECIALIST

## 2022-06-13 PROCEDURE — 73562 X-RAY EXAM OF KNEE 3: CPT | Mod: LT,,, | Performed by: SPECIALIST

## 2022-06-13 PROCEDURE — 73562 XR KNEE 3 VIEW LEFT: ICD-10-PCS | Mod: LT,,, | Performed by: SPECIALIST

## 2022-06-13 PROCEDURE — 99213 OFFICE O/P EST LOW 20 MIN: CPT | Mod: ,,, | Performed by: PHYSICIAN ASSISTANT

## 2022-06-13 PROCEDURE — 73502 XR HIP WITH PELVIS WHEN PERFORMED, 2 OR 3  VIEWS RIGHT: ICD-10-PCS | Mod: RT,,, | Performed by: SPECIALIST

## 2022-06-13 RX ORDER — TRIAMTERENE/HYDROCHLOROTHIAZID 37.5-25 MG
TABLET ORAL
COMMUNITY
Start: 2022-05-15 | End: 2022-12-14 | Stop reason: SDUPTHER

## 2022-06-13 RX ORDER — TAMSULOSIN HYDROCHLORIDE 0.4 MG/1
CAPSULE ORAL
COMMUNITY
Start: 2022-04-27 | End: 2022-06-28 | Stop reason: SDUPTHER

## 2022-06-13 RX ORDER — LOVASTATIN 40 MG/1
40 TABLET ORAL
COMMUNITY
Start: 2021-11-09 | End: 2022-12-14 | Stop reason: SDUPTHER

## 2022-06-13 RX ORDER — AMLODIPINE BESYLATE 5 MG/1
5 TABLET ORAL
COMMUNITY
Start: 2021-11-09 | End: 2022-12-14 | Stop reason: SDUPTHER

## 2022-06-13 RX ORDER — GABAPENTIN 300 MG/1
300 CAPSULE ORAL 2 TIMES DAILY
COMMUNITY
Start: 2022-06-03

## 2022-06-13 RX ORDER — ASPIRIN 81 MG/1
81 TABLET ORAL
COMMUNITY

## 2022-06-13 NOTE — PROGRESS NOTES
Subjective:      Patient ID: Luis Menjivar is a 81 y.o. female.    Chief Complaint: Follow-up (Left TKA 7/1/21. Patient denies any pain in left knee at this time, C/O pain in right hip. States having constant sharp pain in right hip at this time, Reports difficulty walking due to pain.)    81-year-old female presents office today for 9 month follow-up on her left knee.  She is 1 year status post left total knee arthroplasty doing great.  No complaints left knee pain or swelling.  She is complaining of some right buttock pain that does radiate down posterior thigh.  She denies any groin pain.  She denies any history of injury.  She does have a history of back problems and does seek chiropractic treatment      Review of Systems   All other systems reviewed and are negative.        Objective:      Left Knee     No swelling, warmth or tenderness.    Well healed scar    No instability of the knee in mid or deep flexion     Active flexion 120 degrees   Active extension 0 degrees   No weakness was observed.    Sensation intact distally    Intact pedal pulses   X-Ray Knee:   A complete knee x-ray with standing 3 views was performed -of left knee.   Impressions Radiology Test   X-ray of knee was performed intact  knee implant without signs of loosening or subsidence.      Right hip exam  No swelling or erythema  She has some pain in the posterior buttock the sciatic notch  No groin tenderness  Intact full active and passive range of motion the right hip with no pain  Negative straight leg raise  No tenderness over the trochanteric bursa  Sensation intact distally  Pedal pulses 2+    Right hip radiographs taken office today, three views show no osteoarticular abnormalities.  She does have some degenerative disc disease seen in the lumbar spine at L4-5 and L5-S1    Ortho/SPM Exam            Assessment:       Encounter Diagnoses   Name Primary?    Right hip pain Yes    Acute pain of left knee     History of total knee  replacement, left     Lumbar degenerative disc disease     Lumbar radiculopathy           Plan:       Luis was seen today for follow-up.    Diagnoses and all orders for this visit:    Right hip pain    Acute pain of left knee  -     X-Ray Hip 2 or 3 views Right (with Pelvis when performed); Future  -     X-Ray Knee 3 View Left; Future    History of total knee replacement, left    Lumbar degenerative disc disease    Lumbar radiculopathy        Recommend physical therapy program focusing on her back.  She will follow-up with us as needed

## 2022-06-15 PROBLEM — M54.31 SCIATICA OF RIGHT SIDE: Status: ACTIVE | Noted: 2022-06-15

## 2022-06-15 PROBLEM — M25.551 RIGHT HIP PAIN: Status: ACTIVE | Noted: 2022-06-15

## 2022-06-15 PROBLEM — M54.16 LUMBAR RADICULOPATHY: Status: ACTIVE | Noted: 2022-06-15

## 2022-06-15 NOTE — PROGRESS NOTES
[facility]  Physical Therapy Initial Evaluation    Name: Luis Menjivar  Clinic Number: [id number]    Therapy Diagnosis:   Encounter Diagnoses   Name Primary?    Right hip pain     Sciatica of right side  Lumbar radiculopathy      Physician: Agnelito Bowman MD    Physician Orders: PT Eval and Treat  Medical Diagnosis from Referral: Right hip pain, sciatica, lumbar radiculopathy  Evaluation Date: 2022  Authorization Period Expiration: 2022  Plan of Care Expiration: 2022  Visit # / Visits authorized: 0/Medically necessary    Time In: 845  Time Out: 925  Total Appointment Time (timed & untimed codes): 40 minutes  Total Treatment time (time-based codes) separate from Evaluation: 28 minutes    Surgery: None  Orthopedic Precautions: None  Pertinent History: Left TKA 21    Subjective     Luis reports: Has been having right buttock pain for the past 2-3 months, no known trauma. Agg factors are standing on right leg which leads to a sharp pain in her right buttocks, and walking (as she walks more the pain reduces, however when she first gets started she has mild difficulty/pain). Sitting does not exacerbate pain at all. Admits to disturbed sleep 2/2 pain. Denies bowel or bladder changes (has history or urinary ailment that began prior to this episode of buttock pain), falls,  Saddle paresthesia, LE buckling, radiating pain, low back pain. Ibuprofen helps to mildly reduce pain, while Tylenol has no effect on her pain. Saw a chiropractor for this however did not help.    Medical History:   Past Medical History:   Diagnosis Date    High cholesterol     Hypertension        Surgical History:   Luis Menjivar  has a past surgical history that includes Knee surgery; Hysterectomy;  section; Vaginal delivery; Tonsillectomy; and appendix removal.    Medications:   Luis has a current medication list which includes the following prescription(s):  amlodipine, aspirin, gabapentin, lovastatin, tamsulosin, and triamterene-hydrochlorothiazide 37.5-25 mg.    Allergies:   Review of patient's allergies indicates:  No Known Allergies     Imaging (taken 06/13/22)  Left knee XR - standing three views show intact knee implant without signs of loosening or subsidence.  Right hip XR - three views show no osteoarticular abnormalities.  She does have some degenerative disc disease seen in the lumbar spine at L4-5 and L5-S1    Outcome Measure:  Eval: MDQ: 12/50 = 24% disability, 76% functional    Objective     Posture: Good    Gait Analysis: Good, no deviations     Palpation    Right - None ttp buttock, lumbar, SI    Left - None ttp buttock, lumbar, SI       Dermatomes    light touch: intact BLEs     Reflexes    King: absent and bilateral  Right: patellar: 2+  Left: patellar: 1+     Myotomes    Right - KNEE FLX 5/5, KNEE EXT 5/5 and ANKLE DF 5/5    Left - KNEE FLX 5/5, KNEE EXT 5/5 and ANKLE DF 5/5     Hip/SI Clearance    Right - SHAYLA; (-) for pain although with hip tightness and SACRAL COMPRESSION; (-)    Left - SHAYLA; (-) for pain although with hip tightness and SACRAL COMPRESSION; (-)       AROM - Lumbar    normal range of motion bilaterally       Special Tests    Repeated Motions Flx; negative  Repeated Motions Ext; negative  Repeated Motions Lat Flx; negative  Rotation ROM w/ overpressure; negative  Slump; negative bilaterally  SLR; negative bilaterally  Sciatic Mobility; normal bilaterally  90/90 Hamstring; negative bilaterally  Piriformis Test; negative for reproduction of pain, however tightness right>left       Other                   TREATMENT     Luis received the treatments listed below:       Time Activities   Manual     TherAct     TherEx 28 min LTR, bridge with glute set, semi-supine isometric hip (abd, add), seated stretch (low back, PF), passive LE stretch (PF w/ LTR, HS with sciatic glides)   Gait     Neuro Re-ed     Modalities     E-Stim     Dry  Needling     Canalith Repositioning         Home Exercises and Patient Education Provided    Education provided:   - HEP, plan of care    Written Home Exercises Provided: yes.  Exercises were reviewed and Luis was able to demonstrate them prior to the end of the session.  Luis demonstrated good  understanding of the education provided.     See EMR under Media for exercises provided 6/16/2022.    Assessment   Luis is a 81 y.o. female referred to outpatient Physical Therapy with a medical diagnosis of Right hip pain, sciatica, lumbar radiculopathy. Patient presents with decreased right hip strength and flexibility possibly contributing to her symptoms. No lumbar exacerbations thus indeterminate as to etiology being radicular nerve root vs peripheral entrapment by muscle, however do feel that nerve involvement present with potential muscular contribution as well. Patient will benefit from skilled outpatient Physical Therapy to address the deficits stated above, provide education, and to maximize patient's level of independence.     Patient prognosis is Good.     Plan of care discussed with patient: Yes  Patient's spiritual, cultural and educational needs considered and patient is agreeable to the plan of care and goals as stated below:     Anticipated Barriers for therapy: None    Goals:  Short Term Goals: 6 weeks   1. Patient will report at least 10% disability reduction on MDQ to indicate clinically significant functional improvement  2. Patient will report no right buttock hip pain for at least 1/2 day to allow for improved functional performance  3. Patient will be able to stand for 10 minutes on RLE without symptoms exacerbation    Long Term Goals: 12 weeks   1. Patient will report at least 20% disability reduction on MDQ to indicate clinically significant functional improvement  2. Patient will report no right buttock hip pain for at least 3/4 day to allow for improved functional  performance  3. Patient will be able to stand for 20 minutes on RLE without symptoms exacerbation    Plan   Plan of care Certification: 6/16/2022 to 09/16/2022.    Outpatient Physical Therapy 2-3x times weekly for 12 weeks to include the following interventions: Gait Training, Manual Therapy, Moist Heat/ Ice, Neuromuscular Re-ed, Patient Education, Self Care, Therapeutic Activities and Therapeutic Exercise.     Rich Deutsch, PT

## 2022-06-16 ENCOUNTER — CLINICAL SUPPORT (OUTPATIENT)
Dept: REHABILITATION | Facility: HOSPITAL | Age: 82
End: 2022-06-16
Payer: MEDICARE

## 2022-06-16 DIAGNOSIS — M54.16 LUMBAR RADICULOPATHY: ICD-10-CM

## 2022-06-16 DIAGNOSIS — M54.31 SCIATICA OF RIGHT SIDE: ICD-10-CM

## 2022-06-16 DIAGNOSIS — M25.551 RIGHT HIP PAIN: ICD-10-CM

## 2022-06-16 DIAGNOSIS — M51.36 LUMBAR DEGENERATIVE DISC DISEASE: ICD-10-CM

## 2022-06-16 PROCEDURE — 97110 THERAPEUTIC EXERCISES: CPT

## 2022-06-16 PROCEDURE — 97162 PT EVAL MOD COMPLEX 30 MIN: CPT

## 2022-06-21 ENCOUNTER — CLINICAL SUPPORT (OUTPATIENT)
Dept: REHABILITATION | Facility: HOSPITAL | Age: 82
End: 2022-06-21
Payer: MEDICARE

## 2022-06-21 DIAGNOSIS — M54.16 LUMBAR RADICULOPATHY: ICD-10-CM

## 2022-06-21 DIAGNOSIS — M25.551 RIGHT HIP PAIN: Primary | ICD-10-CM

## 2022-06-21 DIAGNOSIS — M54.31 SCIATICA OF RIGHT SIDE: ICD-10-CM

## 2022-06-21 PROCEDURE — 97140 MANUAL THERAPY 1/> REGIONS: CPT

## 2022-06-21 PROCEDURE — 97110 THERAPEUTIC EXERCISES: CPT

## 2022-06-21 NOTE — PLAN OF CARE
Physical Therapy Treatment Note     Name: Luis Menjivar  Clinic Number: [id number]    Therapy Diagnosis:   Encounter Diagnoses   Name Primary?    Right hip pain Yes    Sciatica of right side     Lumbar radiculopathy      Physician: Angelito Bowman MD    Visit Date: 6/21/2022    Physician Orders: PT Eval and Treat  Medical Diagnosis from Referral: Right hip pain, sciatica, lumbar radiculopathy  Evaluation Date: 6/16/2022  Authorization Period Expiration: 09/16/2022  Plan of Care Expiration: 09/16/2022  Visit # / Visits authorized: 1/Medically necessary    Time In: 0910  Time Out: 1003  Total Billable Time: 53 minutes    Surgery: None  Orthopedic Precautions: None  Pertinent History: Left TKA 07/01/21    Subjective     Patient reports: Right buttock pain remains localized to same spot, no radiating downward. She feels like the stretches are helping.    Outcome Measure:  Eval: MDQ: 12/50 = 24% disability, 76% functional    Objective     Luis received the following treatment:     Time Activities   Manual 12 min Passive stretch RLE (HS with sciatic glides, PF), right buttock DTM   TherAct     TherEx 41 min NuStep, bridge w/ abd, add ball sq, SLR, sidelying hip abd, hip ext, HS curl, stretching (PF, PF w/ LTR)   Gait     Neuro Re-ed     Modalities     E-Stim     Dry Needling     Canalith Repositioning           Home Exercises Provided and Patient Education Provided     Education provided:   - Plan of care, HEP    Assessment     Post session right buttock pain 0/10, even with weightbearing which is when pain is normally onset. Localized to right ischial tuberosity, suspect potential tendinopathy. With LE generalized strengthening and stretching, expect continued progress.    Patient prognosis is Good.      Anticipated barriers to physical therapy: None    Goals: Margarite Is progressing well towards her goals.  Short Term Goals: 6 weeks   1. Patient will report at least 10% disability reduction on MDQ to  indicate clinically significant functional improvement  2. Patient will report no right buttock hip pain for at least 1/2 day to allow for improved functional performance  3. Patient will be able to stand for 10 minutes on RLE without symptoms exacerbation     Long Term Goals: 12 weeks   1. Patient will report at least 20% disability reduction on MDQ to indicate clinically significant functional improvement  2. Patient will report no right buttock hip pain for at least 3/4 day to allow for improved functional performance  3. Patient will be able to stand for 20 minutes on RLE without symptoms exacerbation    Plan     2-3x/week x 12 weeks    Rich Deutsch, PT

## 2022-06-23 ENCOUNTER — CLINICAL SUPPORT (OUTPATIENT)
Dept: REHABILITATION | Facility: HOSPITAL | Age: 82
End: 2022-06-23
Payer: MEDICARE

## 2022-06-23 DIAGNOSIS — M54.31 SCIATICA OF RIGHT SIDE: ICD-10-CM

## 2022-06-23 DIAGNOSIS — M54.16 LUMBAR RADICULOPATHY: ICD-10-CM

## 2022-06-23 DIAGNOSIS — M25.551 RIGHT HIP PAIN: Primary | ICD-10-CM

## 2022-06-23 PROCEDURE — 97110 THERAPEUTIC EXERCISES: CPT

## 2022-06-23 PROCEDURE — 97140 MANUAL THERAPY 1/> REGIONS: CPT

## 2022-06-23 NOTE — PLAN OF CARE
Physical Therapy Treatment Note     Name: Luis Menjivar  Clinic Number: [id number]    Therapy Diagnosis:   Encounter Diagnoses   Name Primary?    Right hip pain Yes    Sciatica of right side     Lumbar radiculopathy      Physician: Angelito Bowman MD    Visit Date: 6/23/2022    Physician Orders: PT Eval and Treat  Medical Diagnosis from Referral: Right hip pain, sciatica, lumbar radiculopathy  Evaluation Date: 6/16/2022  Authorization Period Expiration: 09/16/2022  Plan of Care Expiration: 09/16/2022  Visit # / Visits authorized: 2/Medically necessary    Time In: 0915  Time Out: 1010  Total Billable Time: 55 minutes    Surgery: None  Orthopedic Precautions: None  Pertinent History: Left TKA 07/01/21    Subjective     Patient reports: Right buttock pain remains localized to same spot, however moderately better. Able to walk without nearly as much pain to that area as before. She feels like the stretches are helping.    Outcome Measure:  Eval: MDQ: 12/50 = 24% disability, 76% functional    Objective     Luis received the following treatment:     Time Activities   Manual 17 min Passive stretch RLE (HS with sciatic glides, PF w/ LTR, PF, quads, HFs), lumbar rotational mobilizations   TherAct     TherEx 38 min NuStep, bridge w/ abd, add ball sq, SLR, sidelying hip abd, hip ext (long axis PT resistance), HS curl   Gait     Neuro Re-ed     Modalities     E-Stim     Dry Needling     Canalith Repositioning           Home Exercises Provided and Patient Education Provided     Education provided:   - Plan of care, HEP    Assessment     Post session right buttock pain 0/10, even with weightbearing which is when pain is normally onset. Gradually improving overall condition. With LE generalized strengthening and stretching, expect continued progress.    Patient prognosis is Good.      Anticipated barriers to physical therapy: None    Goals: Margarite Is progressing well towards her goals.  Short Term Goals: 6 weeks    1. Patient will report at least 10% disability reduction on MDQ to indicate clinically significant functional improvement  2. Patient will report no right buttock hip pain for at least 1/2 day to allow for improved functional performance  3. Patient will be able to stand for 10 minutes on RLE without symptoms exacerbation     Long Term Goals: 12 weeks   1. Patient will report at least 20% disability reduction on MDQ to indicate clinically significant functional improvement  2. Patient will report no right buttock hip pain for at least 3/4 day to allow for improved functional performance  3. Patient will be able to stand for 20 minutes on RLE without symptoms exacerbation    Plan     2-3x/week x 12 weeks    Rich Deutsch, PT

## 2022-06-28 ENCOUNTER — CLINICAL SUPPORT (OUTPATIENT)
Dept: REHABILITATION | Facility: HOSPITAL | Age: 82
End: 2022-06-28
Payer: MEDICARE

## 2022-06-28 ENCOUNTER — TELEPHONE (OUTPATIENT)
Dept: FAMILY MEDICINE | Facility: CLINIC | Age: 82
End: 2022-06-28
Payer: MEDICARE

## 2022-06-28 DIAGNOSIS — M54.16 LUMBAR RADICULOPATHY: ICD-10-CM

## 2022-06-28 DIAGNOSIS — Z00.00 MEDICARE ANNUAL WELLNESS VISIT, SUBSEQUENT: Primary | ICD-10-CM

## 2022-06-28 DIAGNOSIS — M85.80 OSTEOPENIA, UNSPECIFIED LOCATION: ICD-10-CM

## 2022-06-28 DIAGNOSIS — N18.30 STAGE 3 CHRONIC KIDNEY DISEASE, UNSPECIFIED WHETHER STAGE 3A OR 3B CKD: ICD-10-CM

## 2022-06-28 DIAGNOSIS — E55.9 VITAMIN D DEFICIENCY: ICD-10-CM

## 2022-06-28 DIAGNOSIS — N32.81 OVERACTIVE BLADDER: ICD-10-CM

## 2022-06-28 DIAGNOSIS — R73.03 PREDIABETES: ICD-10-CM

## 2022-06-28 DIAGNOSIS — M54.31 SCIATICA OF RIGHT SIDE: ICD-10-CM

## 2022-06-28 DIAGNOSIS — I25.10 CORONARY ARTERIOSCLEROSIS: ICD-10-CM

## 2022-06-28 DIAGNOSIS — M25.551 RIGHT HIP PAIN: Primary | ICD-10-CM

## 2022-06-28 PROCEDURE — 97140 MANUAL THERAPY 1/> REGIONS: CPT

## 2022-06-28 PROCEDURE — 97110 THERAPEUTIC EXERCISES: CPT

## 2022-06-28 RX ORDER — TAMSULOSIN HYDROCHLORIDE 0.4 MG/1
0.4 CAPSULE ORAL DAILY
Qty: 90 CAPSULE | Refills: 2 | Status: SHIPPED | OUTPATIENT
Start: 2022-06-28 | End: 2022-12-14 | Stop reason: SDUPTHER

## 2022-06-28 NOTE — PLAN OF CARE
Physical Therapy Treatment Note     Name: Luis Menjivar  Clinic Number: [id number]    Therapy Diagnosis:   Encounter Diagnoses   Name Primary?    Right hip pain Yes    Sciatica of right side     Lumbar radiculopathy      Physician: Angelito Bowman MD    Visit Date: 6/28/2022    Physician Orders: PT Eval and Treat  Medical Diagnosis from Referral: Right hip pain, sciatica, lumbar radiculopathy  Evaluation Date: 6/16/2022  Authorization Period Expiration: 09/16/2022  Plan of Care Expiration: 09/16/2022  Visit # / Visits authorized: 3/Medically necessary    Time In: 0930  Time Out: 1012  Total Billable Time: 42 minutes    Surgery: None  Orthopedic Precautions: None  Pertinent History: Left TKA 07/01/21    Subjective     Patient reports: Right buttock pain remains localized to same spot, however continues to get better each day. She is able to walk on right leg with little to no pain. States the arthritis in her back is acting up.    Outcome Measure:  Eval: MDQ: 12/50 = 24% disability, 76% functional    Objective     Luis received the following treatment:     Time Activities   Manual 12 min Passive stretch RLE (HS with sciatic glides, PF, PF w/ LTR, quads, HFs, DKTC)   TherAct     TherEx 30 min NuStep, glute sets, bridge w/ abd, stab ball press, LTR, SLR, sidelying hip abd, hip ext (long axis PT resistance), HS curl, low back stretch   Gait     Neuro Re-ed     Modalities     E-Stim     Dry Needling     Canalith Repositioning           Home Exercises Provided and Patient Education Provided     Education provided:   - Plan of care, HEP    Assessment     Post session right buttock pain 0/10, even with weightbearing which is when pain is normally onset. Gradually improving overall condition. Incorporating lumbar gapping and core stabilization. With LE generalized strengthening and stretching, expect continued progress.    Patient prognosis is Good.      Anticipated barriers to physical therapy: None    Goals:  Luis Is progressing well towards her goals.  Short Term Goals: 6 weeks   1. Patient will report at least 10% disability reduction on MDQ to indicate clinically significant functional improvement  2. Patient will report no right buttock hip pain for at least 1/2 day to allow for improved functional performance  3. Patient will be able to stand for 10 minutes on RLE without symptoms exacerbation     Long Term Goals: 12 weeks   1. Patient will report at least 20% disability reduction on MDQ to indicate clinically significant functional improvement  2. Patient will report no right buttock hip pain for at least 3/4 day to allow for improved functional performance  3. Patient will be able to stand for 20 minutes on RLE without symptoms exacerbation    Plan     2-3x/week x 12 weeks    Rich Deutsch, PT

## 2022-06-28 NOTE — TELEPHONE ENCOUNTER
----- Message from Viola Cano sent at 6/28/2022  7:59 AM CDT -----    Type:  RX Refill Request    Who Called: Patient  Refill or New Rx:refil  RX Name and Strength: Tamsulosin .4mg  How is the patient currently taking it? (ex. 1XDay): one cap tiwce daily  Is this a 30 day or 90 day RX:90  Preferred Pharm[facility] phone number[facility]   Local or Mail Order: mail order  Ordering Provider:Dr Aceves   Would the patient rather a call back or a response via [facility]?   Best Call Back Number: [phone]  Additional Information:

## 2022-06-30 ENCOUNTER — CLINICAL SUPPORT (OUTPATIENT)
Dept: REHABILITATION | Facility: HOSPITAL | Age: 82
End: 2022-06-30
Payer: MEDICARE

## 2022-06-30 DIAGNOSIS — M54.31 SCIATICA OF RIGHT SIDE: ICD-10-CM

## 2022-06-30 DIAGNOSIS — M54.16 LUMBAR RADICULOPATHY: ICD-10-CM

## 2022-06-30 DIAGNOSIS — M25.551 RIGHT HIP PAIN: Primary | ICD-10-CM

## 2022-06-30 PROCEDURE — 97110 THERAPEUTIC EXERCISES: CPT

## 2022-06-30 NOTE — PLAN OF CARE
Physical Therapy Treatment Note     Name: Luis Menjivar  Clinic Number: [id number]    Therapy Diagnosis:   Encounter Diagnoses   Name Primary?    Right hip pain Yes    Sciatica of right side     Lumbar radiculopathy      Physician: Angelito Bowman MD    Visit Date: 6/30/2022    Physician Orders: PT Eval and Treat  Medical Diagnosis from Referral: Right hip pain, sciatica, lumbar radiculopathy  Evaluation Date: 6/16/2022  Authorization Period Expiration: 09/16/2022  Plan of Care Expiration: 09/16/2022  Visit # / Visits authorized: 4/Medically necessary    Time In: 0913  Time Out: 1001  Total Billable Time: 48 minutes    Surgery: None  Orthopedic Precautions: None  Pertinent History: Left TKA 07/01/21    Subjective     Patient reports: Able to walk and put weight on right leg without pain; initial issue resolved. Has been having soreness 2/2 arthritic pain globally.    Outcome Measure:  Eval: MDQ: 12/50 = 24% disability, 76% functional    Objective     Luis received the following treatment:     Time Activities   Manual 0 min Passive stretch RLE (HS with sciatic glides, PF, PF w/ LTR, quads, HFs, DKTC)   TherAct     TherEx 48 min NuStep, glute sets, bridge w/ abd, stab ball press, SLR, sidelying hip abd, hip ext (long axis), HS curl, passive RLE stretch (hamstrings, PF, PF w/ LTR, quads, HFs, low back MFR)   Gait     Neuro Re-ed     Modalities     E-Stim     Dry Needling     Canalith Repositioning           Home Exercises Provided and Patient Education Provided     Education provided:   - Plan of care, HEP    Assessment     Post session right buttock pain 0/10, even with weightbearing which is when pain is normally onset. With consistency in her eliminated pain and functional improvement, will consider discharge after next week. Incorporating lumbar gapping and core stabilization.    Patient prognosis is Good.      Anticipated barriers to physical therapy: None    Goals: Margarite Is progressing well  towards her goals.  Short Term Goals: 6 weeks   1. Patient will report at least 10% disability reduction on MDQ to indicate clinically significant functional improvement  2. Patient will report no right buttock hip pain for at least 1/2 day to allow for improved functional performance  3. Patient will be able to stand for 10 minutes on RLE without symptoms exacerbation     Long Term Goals: 12 weeks   1. Patient will report at least 20% disability reduction on MDQ to indicate clinically significant functional improvement  2. Patient will report no right buttock hip pain for at least 3/4 day to allow for improved functional performance  3. Patient will be able to stand for 20 minutes on RLE without symptoms exacerbation    Plan     2-3x/week x 12 weeks    Rich Deutsch, PT

## 2022-07-05 ENCOUNTER — CLINICAL SUPPORT (OUTPATIENT)
Dept: REHABILITATION | Facility: HOSPITAL | Age: 82
End: 2022-07-05
Payer: MEDICARE

## 2022-07-05 DIAGNOSIS — M54.31 SCIATICA OF RIGHT SIDE: ICD-10-CM

## 2022-07-05 DIAGNOSIS — M54.16 LUMBAR RADICULOPATHY: ICD-10-CM

## 2022-07-05 DIAGNOSIS — M25.551 RIGHT HIP PAIN: Primary | ICD-10-CM

## 2022-07-05 PROCEDURE — 97110 THERAPEUTIC EXERCISES: CPT

## 2022-07-05 NOTE — PLAN OF CARE
Physical Therapy Treatment Note     Name: Luis Menjivar  Clinic Number: [id number]    Therapy Diagnosis:   Encounter Diagnoses   Name Primary?    Right hip pain Yes    Sciatica of right side     Lumbar radiculopathy      Physician: Angelito Bowman MD    Visit Date: 7/5/2022    Physician Orders: PT Eval and Treat  Medical Diagnosis from Referral: Right hip pain, sciatica, lumbar radiculopathy  Evaluation Date: 6/16/2022  Authorization Period Expiration: 09/16/2022  Plan of Care Expiration: 09/16/2022  Visit # / Visits authorized: 5/Medically necessary    Time In: 0910  Time Out: 0955  Total Billable Time: 45 minutes    Surgery: None  Orthopedic Precautions: None  Pertinent History: Left TKA 07/01/21    Subjective     Patient reports: Feels good today, no pain in right buttock or low back. Did not have to struggle at all with getting out of bed.    Outcome Measure:  Eval: MDQ: 12/50 = 24% disability, 76% functional  07/05/22: MDQ: 6/50 = 12% disability, 88% functional    Objective     Luis received the following treatment:     Time Activities   Manual 0 min Passive stretch RLE (HS with sciatic glides, PF, PF w/ LTR, quads, HFs, DKTC)   TherAct     TherEx 45 min NuStep, glute sets, bridge, stab ball press, SLR, sidelying hip abd, HS curl, passive LE stretch (hamstrings, PF, PF w/ LTR, quads, HFs), passive LLE stretch (quads, HFs)   Gait     Neuro Re-ed     Modalities     E-Stim     Dry Needling     Canalith Repositioning           Home Exercises Provided and Patient Education Provided     Education provided:   - Plan of care, HEP    Assessment     Seems to have responded well to core stabilization and lumbar gapping, as back pain is minimal to none and has been since previous visit. Right hip pain eliminated for at least 2-3 visits. All goals met. Will plan for discharge to independent home program after next visit. Incorporating core stabilization.    Patient prognosis is Good.      Anticipated barriers  to physical therapy: None    Goals: Margarite Is progressing well towards her goals.  Short Term Goals: 6 weeks   1. Patient will report at least 10% disability reduction on MDQ to indicate clinically significant functional improvement (goal met)  2. Patient will report no right buttock hip pain for at least 1/2 day to allow for improved functional performance  (goal met)  3. Patient will be able to stand for 10 minutes on RLE without symptoms exacerbation (goal met)     Long Term Goals: 12 weeks   1. Patient will report no right buttock hip pain for at least 3/4 day to allow for improved functional performance (goal met)  2. Patient will be able to stand for 20 minutes on RLE without symptoms exacerbation (goal met)    Plan     2-3x/week x 12 weeks    Rich Deutsch, PT

## 2022-07-06 NOTE — PROGRESS NOTES
Patient ID: [id number]     Chief Complaint: Follow-up        HPI:     Luis Menjivar is a 81 y.o. female here today for 6m revisit HTN, CKD  Labs reviewed with patient.    No acute complaints today          MMG: 3/22,   DEXA: 3/22,   C-scope: 2017, hyperplastic polyps. Fobt neg x 3 nov 2019.   Influenza: utd  Prevnar 13:utd  Tdap: utd  Zostavax: utd  Covid vaccine: declines  Pneumovax 23: utd  Advance directive: she has a living will and POA,      HTN: at goal, compliant with meds      Prediabetes: a1c 6      HLD: On statin, well tolerated      OAB: well controlled with flomax, sees urology     CKD stage 3: advised to avoid nsaids and stay well hydrated. will monitor       Past Medical History:  Chronic lower back pain  CKD (chronic kidney disease) stage 3, GFR 30-59 ml/min  Coronary arteriosclerosis  Family history of breast cancer in mother  GERD (gastroesophageal reflux disease)  History of total knee arthroplasty  Hyperlipidemia  Hypertension  Impaired gait and mobility  Neuropathy, peripheral  Osteoarthritis of left knee  Osteoarthritis of right hip  Osteopenia  Overactive bladder  Personal history of breast cancer  Postmenopausal  Prediabetes  Trochanteric bursitis, right hip     Past Surgical History:   Procedure Laterality Date    APPENDECTOMY  1968    BLADDER SUSPENSION       SECTION       SECTION      COLONOSCOPY  2017    Dr Wilber Short    HYSTERECTOMY  1968    OPEN REDUCTION AND INTERNAL FIXATION (ORIF) OF FRACTURE OF RADIUS  2006    TONSILLECTOMY  1943    TOTAL KNEE ARTHROPLASTY  2021    Dr Angelito Bowman       Review of patient's allergies indicates:  No Known Allergies    Outpatient Medications Marked as Taking for the 22 encounter (Office Visit) with NURSE PRACTITIONER, [facility]   Medication Sig Dispense Refill    amLODIPine (NORVASC) 5 MG tablet Take 5 mg by mouth.      aspirin (ECOTRIN) 81 MG EC tablet Take 81 mg by mouth.    "   gabapentin (NEURONTIN) 300 MG capsule Take 300 mg by mouth 3 (three) times daily.      lovastatin (MEVACOR) 40 MG tablet Take 40 mg by mouth.      pantoprazole (PROTONIX) 40 MG tablet       tamsulosin (FLOMAX) 0.4 mg Cap Take 1 capsule (0.4 mg total) by mouth once daily. 90 capsule 2    triamterene-hydrochlorothiazide 37.5-25 mg (MAXZIDE-25) 37.5-25 mg per tablet          Social History     Socioeconomic History    Marital status:    Tobacco Use    Smoking status: Never Smoker    Smokeless tobacco: Never Used   Substance and Sexual Activity    Alcohol use: Never    Drug use: Never    Sexual activity: Not Currently        Family History   Problem Relation Age of Onset    Heart attack Mother     Breast cancer Mother     Hypertension Mother     Lung cancer Father         cause of death    Hypertension Sister     Hypertension Sister     Prostate cancer Brother         Subjective:     ROS      See HPI for details    Constitutional: Denies Change in appetite. Denies Chills. Denies Fever. Denies Night sweats.  Respiratory: Denies Cough. Denies Shortness of breath. Denies Shortness of breath with exertion. Denies Wheezing.  Cardiovascular: Denies Chest pain at rest. Denies Chest pain with exertion. Denies Irregular heartbeat. Denies Palpitations.  Gastrointestinal: Denies Abdominal pain. Denies Diarrhea. Denies Nausea. Denies Vomiting. Denies Hematemesis or Hematochezia.  Genitourinary: Denies Dysuria. Denies Urinary frequency. Denies Urinary urgency. Denies Blood in urine.  Endocrine: Denies Cold intolerance. Denies Excessive thirst. Denies Heat intolerance. Denies Weight loss. Denies Weight gain.  Musculoskeletal: Denies Painful joints. Denies Weakness.  Integumentary: Denies Rash. Denies Itching. Denies Dry skin.  All Other ROS: Negative except as stated in HPI.       Objective:     /61   Pulse 62   Temp 97.2 °F (36.2 °C) (Tympanic)   Resp 18   Ht 5' 9.69" (1.77 m)   Wt 95 kg (209 " lb 6.4 oz)   SpO2 96%   BMI 30.32 kg/m²     Physical Exam    General: Alert and oriented, No acute distress.  Head: Normocephalic,  Respiratory: Clear to auscultation bilaterally; No wheezes, rales or rhonchi,Non-labored respirations, Symmetrical chest wall expansion.  Cardiovascular: Regular rate and rhythm, S1/S2 normal, No murmurs, rubs or gallops.  Gastrointestinal: Soft, Non-tender, Non-distended, Normal bowel sounds, No palpable organomegaly.  Musculoskeletal: Normal range of motion.  Integumentary: Warm, Dry, Intact, No suspicious lesions or rashes.  Extremities: No clubbing, cyanosis or edema  Neurologic: No focal deficits,   Psychiatric: Normal interaction,Appropriate affect     Lab Visit on 07/11/2022   Component Date Value Ref Range Status    Cholesterol Total 07/11/2022 172  <=200 mg/dL Final    HDL Cholesterol 07/11/2022 51  35 - 60 mg/dL Final    Triglyceride 07/11/2022 127  37 - 140 mg/dL Final    Cholesterol/HDL Ratio 07/11/2022 3  0 - 5 Final    Very Low Density Lipoprotein 07/11/2022 25   Final    LDL Cholesterol 07/11/2022 96.00  50.00 - 140.00 mg/dL Final    Color, UA 07/11/2022 Yellow  Yellow, Colorless, Other, Clear Final    Appearance, UA 07/11/2022 SL CLOUDY (A) Clear Final    Specific Gravity, UA 07/11/2022 1.020   Final    pH, UA 07/11/2022 7.0  5.0, 5.5, 6.0, 6.5, 7.0, 7.5, 8.0, 8.5 Final    Protein, UA 07/11/2022 Negative  Negative, 300  mg/dL Final    Glucose, UA 07/11/2022 Negative  Negative, Normal mg/dL Final    Ketones, UA 07/11/2022 Negative  Negative, +1, +2, +3, +4, +5, >=160, >=80 mg/dL Final    Blood, UA 07/11/2022 Negative  Negative unit/L Final    Bilirubin, UA 07/11/2022 Negative  Negative mg/dL Final    Urobilinogen, UA 07/11/2022 0.2  0.2, 1.0, Normal mg/dL Final    Nitrites, UA 07/11/2022 Negative  Negative Final    Leukocyte Esterase, UA 07/11/2022 1+ (A) Negative, 75  unit/L Final    Vit D 25 OH 07/11/2022 73.6  30.0 - 80.0 ng/mL Final    Sodium  Level 07/11/2022 144  136 - 145 mmol/L Final    Potassium Level 07/11/2022 4.3  3.5 - 5.1 mmol/L Final    Chloride 07/11/2022 104  98 - 107 mmol/L Final    Carbon Dioxide 07/11/2022 30  23 - 31 mmol/L Final    Glucose Level 07/11/2022 106  82 - 115 mg/dL Final    Blood Urea Nitrogen 07/11/2022 31.0 (A) 9.8 - 20.1 mg/dL Final    Creatinine 07/11/2022 1.19 (A) 0.55 - 1.02 mg/dL Final    Calcium Level Total 07/11/2022 9.4  8.4 - 10.2 mg/dL Final    Protein Total 07/11/2022 6.8  5.8 - 7.6 gm/dL Final    Albumin Level 07/11/2022 3.7  3.4 - 4.8 gm/dL Final    Globulin 07/11/2022 3.1  2.4 - 3.5 gm/dL Final    Albumin/Globulin Ratio 07/11/2022 1.2  1.1 - 2.0 ratio Final    Bilirubin Total 07/11/2022 0.6  <=1.5 mg/dL Final    Alkaline Phosphatase 07/11/2022 77  40 - 150 unit/L Final    Alanine Aminotransferase 07/11/2022 15  0 - 55 unit/L Final    Aspartate Aminotransferase 07/11/2022 21  5 - 34 unit/L Final    Estimated GFR-Non  07/11/2022 46  mls/min/1.73/m2 Final    Hemoglobin A1c 07/11/2022 6.1  <=7.0 % Final    Estimated Average Glucose 07/11/2022 128.4  mg/dL Final    WBC 07/11/2022 6.7  4.5 - 11.5 x10(3)/mcL Final    RBC 07/11/2022 4.25  4.20 - 5.40 x10(6)/mcL Final    Hgb 07/11/2022 12.7  12.0 - 16.0 gm/dL Final    Hct 07/11/2022 39.2  37.0 - 47.0 % Final    MCV 07/11/2022 92.2  80.0 - 94.0 fL Final    MCH 07/11/2022 29.9  27.0 - 31.0 pg Final    MCHC 07/11/2022 32.4 (A) 33.0 - 36.0 mg/dL Final    RDW 07/11/2022 14.9  11.5 - 17.0 % Final    Platelet 07/11/2022 192  130 - 400 x10(3)/mcL Final    MPV 07/11/2022 11.4 (A) 7.4 - 10.4 fL Final    Neut % 07/11/2022 61.5  % Final    Lymph % 07/11/2022 24.5  % Final    Mono % 07/11/2022 9.1  % Final    Eos % 07/11/2022 3.6  % Final    Basophil % 07/11/2022 1.2  % Final    Lymph # 07/11/2022 1.64  0.6 - 4.6 x10(3)/mcL Final    Neut # 07/11/2022 4.1  2.1 - 9.2 x10(3)/mcL Final    Mono # 07/11/2022 0.61  0.1 - 1.3 x10(3)/mcL  Final    Eos # 07/11/2022 0.24  0 - 0.9 x10(3)/mcL Final    Baso # 07/11/2022 0.08  0 - 0.2 x10(3)/mcL Final    IG# 07/11/2022 0.01  0 - 0.04 x10(3)/mcL Final    IG% 07/11/2022 0.1  % Final    NRBC% 07/11/2022 0.0  % Final    Bacteria, UA 07/11/2022 Few (A) None Seen, Rare, Occasional /HPF Final    RBC, UA 07/11/2022 0-2  None Seen, 0-2, 3-5, 0-5 /HPF Final    WBC, UA 07/11/2022 11-20 (A) None Seen, 0-2, 3-5, 0-5 /HPF Final    Squamous Epithelial Cells, UA 07/11/2022 Few (A) None Seen, Rare, Occasional, Occ /HPF Final    Urine Culture 07/11/2022 No Growth At 24 Hours   Preliminary       Assessment:       ICD-10-CM ICD-9-CM   1. Hypertension, unspecified type  I10 401.9   2. Stage 3a chronic kidney disease  N18.31 585.3        Plan:     1. Hypertension, unspecified type  At goal, continue current mgmt  2. Stage 3a chronic kidney disease  Stable, avoid NSAIDS, increase water intake         Medication List with Changes/Refills   Current Medications    AMLODIPINE (NORVASC) 5 MG TABLET    Take 5 mg by mouth.       Start Date: 11/9/2021 End Date: --    ASPIRIN (ECOTRIN) 81 MG EC TABLET    Take 81 mg by mouth.       Start Date: --        End Date: --    GABAPENTIN (NEURONTIN) 300 MG CAPSULE    Take 300 mg by mouth 3 (three) times daily.       Start Date: 6/3/2022  End Date: --    LOVASTATIN (MEVACOR) 40 MG TABLET    Take 40 mg by mouth.       Start Date: 11/9/2021 End Date: --    PANTOPRAZOLE (PROTONIX) 40 MG TABLET           Start Date: 7/3/2022  End Date: --    TAMSULOSIN (FLOMAX) 0.4 MG CAP    Take 1 capsule (0.4 mg total) by mouth once daily.       Start Date: 6/28/2022 End Date: --    TRIAMTERENE-HYDROCHLOROTHIAZIDE 37.5-25 MG (MAXZIDE-25) 37.5-25 MG PER TABLET           Start Date: 5/15/2022 End Date: --          Follow up in about 6 months (around 1/12/2023) for Wellness.

## 2022-07-07 ENCOUNTER — CLINICAL SUPPORT (OUTPATIENT)
Dept: REHABILITATION | Facility: HOSPITAL | Age: 82
End: 2022-07-07
Payer: MEDICARE

## 2022-07-07 DIAGNOSIS — M54.31 SCIATICA OF RIGHT SIDE: Primary | ICD-10-CM

## 2022-07-07 DIAGNOSIS — M25.551 RIGHT HIP PAIN: ICD-10-CM

## 2022-07-07 DIAGNOSIS — M54.16 LUMBAR RADICULOPATHY: ICD-10-CM

## 2022-07-07 PROCEDURE — 97110 THERAPEUTIC EXERCISES: CPT

## 2022-07-07 NOTE — PLAN OF CARE
Physical Therapy Treatment Note     Name: Luis Menjivar  Clinic Number: [id number]    Therapy Diagnosis:   Encounter Diagnoses   Name Primary?    Sciatica of right side Yes    Right hip pain     Lumbar radiculopathy      Physician: Angelito Bowman MD    Visit Date: 7/7/2022    Physician Orders: PT Eval and Treat  Medical Diagnosis from Referral: Right hip pain, sciatica, lumbar radiculopathy  Evaluation Date: 6/16/2022  Authorization Period Expiration: 09/16/2022  Plan of Care Expiration: 09/16/2022  Visit # / Visits authorized: 6/Medically necessary    Time In: 0910  Time Out: 0935  Total Billable Time: 25 minutes    Surgery: None  Orthopedic Precautions: None  Pertinent History: Left TKA 07/01/21    Subjective     Patient reports: Feels good today, no pain in right buttock or low back. Had some post-exercise soreness day of previous session, now fully resolved. Also with some right knee pain following previous session, however also fully resolved. Ready for discharge to independent home program. States she has home bike that she will use.    Outcome Measure:  Eval: MDQ: 12/50 = 24% disability, 76% functional  07/05/22: MDQ: 6/50 = 12% disability, 88% functional    Objective     Luis received the following treatment:     Time Activities   Manual 0 min Passive stretch RLE (HS with sciatic glides, PF, PF w/ LTR, quads, HFs, DKTC)   TherAct     TherEx 25 min NuStep, HEP   Gait     Neuro Re-ed     Modalities     E-Stim     Dry Needling     Canalith Repositioning           Home Exercises Provided and Patient Education Provided     Education provided:   - Plan of care, HEP (printed, given, and reviewed; see chart in media section), activity recommendations    Assessment     Seems to have responded well to core stabilization and lumbar gapping, as back pain is minimal to none and has been since previous visit. Right hip pain eliminated. All goals met. Will discharge to independent home program. Encouraged to  resume walking program, use of stationary bike, and normal ADL performance.    Patient prognosis is Good.      Anticipated barriers to physical therapy: None    Goals: Margarite Is progressing well towards her goals.  Short Term Goals: 6 weeks   1. Patient will report at least 10% disability reduction on MDQ to indicate clinically significant functional improvement (goal met)  2. Patient will report no right buttock hip pain for at least 1/2 day to allow for improved functional performance  (goal met)  3. Patient will be able to stand for 10 minutes on RLE without symptoms exacerbation (goal met)     Long Term Goals: 12 weeks   1. Patient will report no right buttock hip pain for at least 3/4 day to allow for improved functional performance (goal met)  2. Patient will be able to stand for 20 minutes on RLE without symptoms exacerbation (goal met)    Plan     Discharge to independent [facility]    Rich Deutsch, PT

## 2022-07-11 ENCOUNTER — LAB VISIT (OUTPATIENT)
Dept: LAB | Facility: HOSPITAL | Age: 82
End: 2022-07-11
Attending: FAMILY MEDICINE
Payer: MEDICARE

## 2022-07-11 DIAGNOSIS — E55.9 VITAMIN D DEFICIENCY: ICD-10-CM

## 2022-07-11 DIAGNOSIS — N18.30 STAGE 3 CHRONIC KIDNEY DISEASE, UNSPECIFIED WHETHER STAGE 3A OR 3B CKD: ICD-10-CM

## 2022-07-11 DIAGNOSIS — I25.10 CORONARY ARTERIOSCLEROSIS: ICD-10-CM

## 2022-07-11 DIAGNOSIS — R73.03 PREDIABETES: ICD-10-CM

## 2022-07-11 DIAGNOSIS — M85.80 OSTEOPENIA, UNSPECIFIED LOCATION: ICD-10-CM

## 2022-07-11 DIAGNOSIS — Z00.00 MEDICARE ANNUAL WELLNESS VISIT, SUBSEQUENT: ICD-10-CM

## 2022-07-11 LAB
ALBUMIN SERPL-MCNC: 3.7 GM/DL (ref 3.4–4.8)
ALBUMIN/GLOB SERPL: 1.2 RATIO (ref 1.1–2)
ALP SERPL-CCNC: 77 UNIT/L (ref 40–150)
ALT SERPL-CCNC: 15 UNIT/L (ref 0–55)
APPEARANCE UR: ABNORMAL
AST SERPL-CCNC: 21 UNIT/L (ref 5–34)
BACTERIA #/AREA URNS AUTO: ABNORMAL /HPF
BASOPHILS # BLD AUTO: 0.08 X10(3)/MCL (ref 0–0.2)
BASOPHILS NFR BLD AUTO: 1.2 %
BILIRUB UR QL STRIP.AUTO: NEGATIVE MG/DL
BILIRUBIN DIRECT+TOT PNL SERPL-MCNC: 0.6 MG/DL
BUN SERPL-MCNC: 31 MG/DL (ref 9.8–20.1)
CALCIUM SERPL-MCNC: 9.4 MG/DL (ref 8.4–10.2)
CHLORIDE SERPL-SCNC: 104 MMOL/L (ref 98–107)
CHOLEST SERPL-MCNC: 172 MG/DL
CHOLEST/HDLC SERPL: 3 {RATIO} (ref 0–5)
CO2 SERPL-SCNC: 30 MMOL/L (ref 23–31)
COLOR UR AUTO: YELLOW
CREAT SERPL-MCNC: 1.19 MG/DL (ref 0.55–1.02)
DEPRECATED CALCIDIOL+CALCIFEROL SERPL-MC: 73.6 NG/ML (ref 30–80)
EOSINOPHIL # BLD AUTO: 0.24 X10(3)/MCL (ref 0–0.9)
EOSINOPHIL NFR BLD AUTO: 3.6 %
ERYTHROCYTE [DISTWIDTH] IN BLOOD BY AUTOMATED COUNT: 14.9 % (ref 11.5–17)
EST. AVERAGE GLUCOSE BLD GHB EST-MCNC: 128.4 MG/DL
GLOBULIN SER-MCNC: 3.1 GM/DL (ref 2.4–3.5)
GLUCOSE SERPL-MCNC: 106 MG/DL (ref 82–115)
GLUCOSE UR QL STRIP.AUTO: NEGATIVE MG/DL
HBA1C MFR BLD: 6.1 %
HCT VFR BLD AUTO: 39.2 % (ref 37–47)
HDLC SERPL-MCNC: 51 MG/DL (ref 35–60)
HGB BLD-MCNC: 12.7 GM/DL (ref 12–16)
IMM GRANULOCYTES # BLD AUTO: 0.01 X10(3)/MCL (ref 0–0.04)
IMM GRANULOCYTES NFR BLD AUTO: 0.1 %
KETONES UR QL STRIP.AUTO: NEGATIVE MG/DL
LDLC SERPL CALC-MCNC: 96 MG/DL (ref 50–140)
LEUKOCYTE ESTERASE UR QL STRIP.AUTO: ABNORMAL UNIT/L
LYMPHOCYTES # BLD AUTO: 1.64 X10(3)/MCL (ref 0.6–4.6)
LYMPHOCYTES NFR BLD AUTO: 24.5 %
MCH RBC QN AUTO: 29.9 PG (ref 27–31)
MCHC RBC AUTO-ENTMCNC: 32.4 MG/DL (ref 33–36)
MCV RBC AUTO: 92.2 FL (ref 80–94)
MONOCYTES # BLD AUTO: 0.61 X10(3)/MCL (ref 0.1–1.3)
MONOCYTES NFR BLD AUTO: 9.1 %
NEUTROPHILS # BLD AUTO: 4.1 X10(3)/MCL (ref 2.1–9.2)
NEUTROPHILS NFR BLD AUTO: 61.5 %
NITRITE UR QL STRIP.AUTO: NEGATIVE
NRBC BLD AUTO-RTO: 0 %
PH UR STRIP.AUTO: 7 [PH]
PLATELET # BLD AUTO: 192 X10(3)/MCL (ref 130–400)
PMV BLD AUTO: 11.4 FL (ref 7.4–10.4)
POTASSIUM SERPL-SCNC: 4.3 MMOL/L (ref 3.5–5.1)
PROT SERPL-MCNC: 6.8 GM/DL (ref 5.8–7.6)
PROT UR QL STRIP.AUTO: NEGATIVE MG/DL
RBC # BLD AUTO: 4.25 X10(6)/MCL (ref 4.2–5.4)
RBC #/AREA URNS AUTO: ABNORMAL /HPF
RBC UR QL AUTO: NEGATIVE UNIT/L
SODIUM SERPL-SCNC: 144 MMOL/L (ref 136–145)
SP GR UR STRIP.AUTO: 1.02
SQUAMOUS #/AREA URNS AUTO: ABNORMAL /HPF
TRIGL SERPL-MCNC: 127 MG/DL (ref 37–140)
UROBILINOGEN UR STRIP-ACNC: 0.2 MG/DL
VLDLC SERPL CALC-MCNC: 25 MG/DL
WBC # SPEC AUTO: 6.7 X10(3)/MCL (ref 4.5–11.5)
WBC #/AREA URNS AUTO: ABNORMAL /HPF

## 2022-07-11 PROCEDURE — 83036 HEMOGLOBIN GLYCOSYLATED A1C: CPT

## 2022-07-11 PROCEDURE — 80053 COMPREHEN METABOLIC PANEL: CPT

## 2022-07-11 PROCEDURE — 82306 VITAMIN D 25 HYDROXY: CPT

## 2022-07-11 PROCEDURE — 87088 URINE BACTERIA CULTURE: CPT

## 2022-07-11 PROCEDURE — 36415 COLL VENOUS BLD VENIPUNCTURE: CPT

## 2022-07-11 PROCEDURE — 81001 URINALYSIS AUTO W/SCOPE: CPT

## 2022-07-11 PROCEDURE — 85025 COMPLETE CBC W/AUTO DIFF WBC: CPT

## 2022-07-11 PROCEDURE — 80061 LIPID PANEL: CPT

## 2022-07-12 ENCOUNTER — OFFICE VISIT (OUTPATIENT)
Dept: FAMILY MEDICINE | Facility: CLINIC | Age: 82
End: 2022-07-12
Payer: MEDICARE

## 2022-07-12 VITALS
WEIGHT: 209.38 LBS | SYSTOLIC BLOOD PRESSURE: 117 MMHG | HEART RATE: 62 BPM | OXYGEN SATURATION: 96 % | RESPIRATION RATE: 18 BRPM | DIASTOLIC BLOOD PRESSURE: 61 MMHG | TEMPERATURE: 97 F | BODY MASS INDEX: 29.97 KG/M2 | HEIGHT: 70 IN

## 2022-07-12 DIAGNOSIS — N18.31 STAGE 3A CHRONIC KIDNEY DISEASE: ICD-10-CM

## 2022-07-12 DIAGNOSIS — I10 HYPERTENSION, UNSPECIFIED TYPE: Primary | ICD-10-CM

## 2022-07-12 PROCEDURE — 99213 OFFICE O/P EST LOW 20 MIN: CPT | Mod: ,,, | Performed by: NURSE PRACTITIONER

## 2022-07-12 PROCEDURE — 99213 PR OFFICE/OUTPT VISIT, EST, LEVL III, 20-29 MIN: ICD-10-PCS | Mod: ,,, | Performed by: NURSE PRACTITIONER

## 2022-07-12 RX ORDER — PANTOPRAZOLE SODIUM 40 MG/1
TABLET, DELAYED RELEASE ORAL
COMMUNITY
Start: 2022-07-03

## 2022-07-13 LAB — BACTERIA UR CULT: NORMAL

## 2022-09-07 ENCOUNTER — OFFICE VISIT (OUTPATIENT)
Dept: ORTHOPEDICS | Facility: CLINIC | Age: 82
End: 2022-09-07
Payer: MEDICARE

## 2022-09-07 ENCOUNTER — HOSPITAL ENCOUNTER (OUTPATIENT)
Dept: RADIOLOGY | Facility: CLINIC | Age: 82
Discharge: HOME | End: 2022-09-07
Attending: PHYSICIAN ASSISTANT
Payer: MEDICARE

## 2022-09-07 ENCOUNTER — TELEPHONE (OUTPATIENT)
Dept: ORTHOPEDICS | Facility: CLINIC | Age: 82
End: 2022-09-07
Payer: MEDICARE

## 2022-09-07 VITALS — WEIGHT: 205 LBS | HEIGHT: 69 IN | BODY MASS INDEX: 30.36 KG/M2

## 2022-09-07 DIAGNOSIS — M25.561 RIGHT KNEE PAIN, UNSPECIFIED CHRONICITY: ICD-10-CM

## 2022-09-07 DIAGNOSIS — M17.11 PRIMARY OSTEOARTHRITIS OF RIGHT KNEE: Primary | ICD-10-CM

## 2022-09-07 PROCEDURE — 73564 XR KNEE COMP 4 OR MORE VIEWS RIGHT: ICD-10-PCS | Mod: RT,,, | Performed by: PHYSICIAN ASSISTANT

## 2022-09-07 PROCEDURE — 20610 LARGE JOINT ASPIRATION/INJECTION: R KNEE: ICD-10-PCS | Mod: RT,,, | Performed by: PHYSICIAN ASSISTANT

## 2022-09-07 PROCEDURE — 73564 X-RAY EXAM KNEE 4 OR MORE: CPT | Mod: RT,,, | Performed by: PHYSICIAN ASSISTANT

## 2022-09-07 PROCEDURE — 99213 PR OFFICE/OUTPT VISIT, EST, LEVL III, 20-29 MIN: ICD-10-PCS | Mod: 25,,, | Performed by: PHYSICIAN ASSISTANT

## 2022-09-07 PROCEDURE — 20610 DRAIN/INJ JOINT/BURSA W/O US: CPT | Mod: RT,,, | Performed by: PHYSICIAN ASSISTANT

## 2022-09-07 PROCEDURE — 99213 OFFICE O/P EST LOW 20 MIN: CPT | Mod: 25,,, | Performed by: PHYSICIAN ASSISTANT

## 2022-09-07 RX ORDER — BETAMETHASONE SODIUM PHOSPHATE AND BETAMETHASONE ACETATE 3; 3 MG/ML; MG/ML
12 INJECTION, SUSPENSION INTRA-ARTICULAR; INTRALESIONAL; INTRAMUSCULAR; SOFT TISSUE
Status: DISCONTINUED | OUTPATIENT
Start: 2022-09-07 | End: 2022-09-07 | Stop reason: HOSPADM

## 2022-09-07 RX ADMIN — BETAMETHASONE SODIUM PHOSPHATE AND BETAMETHASONE ACETATE 12 MG: 3; 3 INJECTION, SUSPENSION INTRA-ARTICULAR; INTRALESIONAL; INTRAMUSCULAR; SOFT TISSUE at 02:09

## 2022-09-07 NOTE — PROGRESS NOTES
Chief Complaint:   Chief Complaint   Patient presents with    Knee Pain     twisted right knee by standing up on 22, stabbing pain when starting to move, throbbing when motionless       History of present illness:    82-year-old female presents office today for evaluation of her right knee pain.  She noted pain yesterday when rising from a seated to a standing position and [employer].  Her pain is over the anterior aspect of the knee.  She denies any locking, catching giving way episodes.  She does admit some swelling    Past Medical History:   Diagnosis Date    Chronic lower back pain     CKD (chronic kidney disease) stage 3, GFR 30-59 ml/min     Coronary arteriosclerosis     Family history of breast cancer in mother     GERD (gastroesophageal reflux disease)     History of total knee arthroplasty     Hyperlipidemia     Hypertension     Impaired gait and mobility     Neuropathy, peripheral     Osteoarthritis of left knee     Osteoarthritis of right hip     Osteopenia     Overactive bladder     Personal history of breast cancer     Postmenopausal     Prediabetes     Trochanteric bursitis, right hip        Past Surgical History:   Procedure Laterality Date    APPENDECTOMY      BLADDER SUSPENSION       SECTION       SECTION      COLONOSCOPY  2017    Dr Wilber Short    HYSTERECTOMY  1968    OPEN REDUCTION AND INTERNAL FIXATION (ORIF) OF FRACTURE OF RADIUS  2006    TONSILLECTOMY  1943    TOTAL KNEE ARTHROPLASTY  2021    Dr Angelito Bowman       Current Outpatient Medications   Medication Sig    amLODIPine (NORVASC) 5 MG tablet Take 5 mg by mouth.    aspirin (ECOTRIN) 81 MG EC tablet Take 81 mg by mouth.    gabapentin (NEURONTIN) 300 MG capsule Take 300 mg by mouth 3 (three) times daily.    lovastatin (MEVACOR) 40 MG tablet Take 40 mg by mouth.    tamsulosin (FLOMAX) 0.4 mg Cap Take 1 capsule (0.4 mg total) by mouth once daily.    triamterene-hydrochlorothiazide 37.5-25 mg  "(MAXZIDE-25) 37.5-25 mg per tablet     pantoprazole (PROTONIX) 40 MG tablet      No current facility-administered medications for this visit.       Review of patient's allergies indicates:  No Known Allergies    Family History   Problem Relation Age of Onset    Heart attack Mother     Breast cancer Mother     Hypertension Mother     Lung cancer Father         cause of death    Hypertension Sister     Hypertension Sister     Prostate cancer Brother        Social History     Socioeconomic History    Marital status:    Tobacco Use    Smoking status: Never    Smokeless tobacco: Never   Substance and Sexual Activity    Alcohol use: Never    Drug use: Never    Sexual activity: Not Currently         Review of Systems:    Constitution:   Denies chills, fever, and sweats.  HENT:   Denies headaches or blurry vision.  Cardiovascular:  Denies chest pain or irregular heart beat.  Respiratory:   Denies cough or shortness of breath.  Gastrointestinal:  Denies abdominal pain, nausea, or vomiting.  Musculoskeletal:   Denies muscle cramps.  Neurological:   Denies dizziness or focal weakness.  Psychiatric/Behavior: Normal mental status.  Hematology/Lymph:  Denies bleeding problem or easy bruising/bleeding.  Skin:    Denies rash or suspicious lesions.    Examination:    Vital Signs:    Vitals:    09/07/22 1446 09/07/22 1447   Weight: 93 kg (205 lb)    Height: 5' 9" (1.753 m)    PainSc:  10-Worst pain ever       Body mass index is 30.27 kg/m².    Constitution:   Well-developed, well nourished patient in no acute distress.  Neurological:   Alert and oriented x 3 and cooperative to examination.     Psychiatric/Behavior: Normal mental status.  Respiratory:   No shortness of breath.  Eyes:    Extraoccular muscles intact  Skin:    No scars, rash or suspicious lesions.    Physical Exam:     Right knee exam   Mild effusion but no erythema   She has patellofemoral crepitance noted   She has pain with patellar compression   Mild medial " joint line tenderness   No lateral joint line tenderness  Negative medial and lateral Latasha   Range of motion is from 5-120 degrees   Her knee is ligamentously stable   Neurovascular intact distally     Right knee radiographs, four views taken office today show moderate joint space narrowing in the patellofemoral compartment.  She does have maintained medial and lateral compartments.  No fracture seen        Assessment:     Primary osteoarthritis of right knee  -     Large Joint Aspiration/Injection: R knee    Right knee pain, unspecified chronicity  -     X-Ray Knee Complete 4 Or More Views Right; Future; Expected date: 09/07/2022        Plan:      I discussed exam and x-ray findings with the patient and her daughter today.  She does have moderate patellofemoral compartment osteoarthritis.  I do recommend a corticosteroid injection today as well as some home exercises.  We discussed ice and over-the-counter NSAIDs.  She will follow-up with us as needed        No follow-ups on file.    DISCLAIMER: This note may have been dictated using voice recognition software and may contain grammatical errors.

## 2022-09-07 NOTE — PROCEDURES
Large Joint Aspiration/Injection: R knee    Date/Time: 9/7/2022 2:30 PM  Performed by: MARISABEL Basilio  Authorized by: MARISABEL Basilio     Consent Done?:  Yes (Verbal)  Indications:  Arthritis  Timeout: prior to procedure the correct patient, procedure, and site was verified    Prep: patient was prepped and draped in usual sterile fashion      Local anesthesia used?: Yes    Local anesthetic:  Lidocaine 2% without epinephrine    Details:  Needle Size:  25 G  Ultrasonic Guidance for needle placement?: No    Location:  Knee  Site:  R knee  Medications:  12 mg betamethasone acetate-betamethasone sodium phosphate 6 mg/mL  Patient tolerance:  Patient tolerated the procedure well with no immediate complications

## 2022-09-07 NOTE — TELEPHONE ENCOUNTER
Patient contacted office stating she was getting up at [employer] yesterday evening (9/6/22) somehow twisted her right knee. She did not go to urgent care or E.R.

## 2022-12-14 DIAGNOSIS — E78.5 HYPERLIPIDEMIA, UNSPECIFIED HYPERLIPIDEMIA TYPE: ICD-10-CM

## 2022-12-14 DIAGNOSIS — I10 HYPERTENSION, UNSPECIFIED TYPE: Primary | ICD-10-CM

## 2022-12-14 DIAGNOSIS — N32.81 OVERACTIVE BLADDER: ICD-10-CM

## 2022-12-14 RX ORDER — LOVASTATIN 40 MG/1
40 TABLET ORAL NIGHTLY
Qty: 90 TABLET | Refills: 1 | Status: SHIPPED | OUTPATIENT
Start: 2022-12-14 | End: 2023-05-25

## 2022-12-14 RX ORDER — AMLODIPINE BESYLATE 5 MG/1
5 TABLET ORAL DAILY
Qty: 90 TABLET | Refills: 1 | Status: SHIPPED | OUTPATIENT
Start: 2022-12-14 | End: 2023-05-25

## 2022-12-14 RX ORDER — TAMSULOSIN HYDROCHLORIDE 0.4 MG/1
0.4 CAPSULE ORAL DAILY
Qty: 90 CAPSULE | Refills: 2 | Status: SHIPPED | OUTPATIENT
Start: 2022-12-14

## 2022-12-14 RX ORDER — TRIAMTERENE/HYDROCHLOROTHIAZID 37.5-25 MG
1 TABLET ORAL DAILY
Qty: 90 TABLET | Refills: 1 | Status: SHIPPED | OUTPATIENT
Start: 2022-12-14 | End: 2023-05-25

## 2023-01-23 ENCOUNTER — LAB VISIT (OUTPATIENT)
Dept: LAB | Facility: HOSPITAL | Age: 83
End: 2023-01-23
Attending: FAMILY MEDICINE
Payer: MEDICARE

## 2023-01-23 ENCOUNTER — OFFICE VISIT (OUTPATIENT)
Dept: FAMILY MEDICINE | Facility: CLINIC | Age: 83
End: 2023-01-23
Payer: MEDICARE

## 2023-01-23 VITALS
RESPIRATION RATE: 18 BRPM | HEART RATE: 68 BPM | WEIGHT: 205.81 LBS | DIASTOLIC BLOOD PRESSURE: 72 MMHG | SYSTOLIC BLOOD PRESSURE: 116 MMHG | OXYGEN SATURATION: 96 % | TEMPERATURE: 97 F | BODY MASS INDEX: 30.48 KG/M2 | HEIGHT: 69 IN

## 2023-01-23 DIAGNOSIS — Z00.00 ROUTINE GENERAL MEDICAL EXAMINATION AT A HEALTH CARE FACILITY: Primary | ICD-10-CM

## 2023-01-23 DIAGNOSIS — G62.9 NEUROPATHY: ICD-10-CM

## 2023-01-23 DIAGNOSIS — Z00.00 ROUTINE GENERAL MEDICAL EXAMINATION AT A HEALTH CARE FACILITY: ICD-10-CM

## 2023-01-23 PROBLEM — M85.80 OSTEOPENIA: Status: ACTIVE | Noted: 2023-01-23

## 2023-01-23 PROBLEM — Z85.3 HISTORY OF BREAST CANCER: Status: ACTIVE | Noted: 2023-01-23

## 2023-01-23 PROBLEM — M70.60 GREATER TROCHANTERIC BURSITIS: Status: ACTIVE | Noted: 2023-01-23

## 2023-01-23 PROBLEM — E66.9 OBESITY: Status: ACTIVE | Noted: 2023-01-23

## 2023-01-23 PROBLEM — Z17.0 MALIGNANT NEOPLASM OF LEFT BREAST IN FEMALE, ESTROGEN RECEPTOR POSITIVE: Status: ACTIVE | Noted: 2017-11-30

## 2023-01-23 PROBLEM — M17.12 OSTEOARTHRITIS OF LEFT KNEE: Status: ACTIVE | Noted: 2023-01-23

## 2023-01-23 PROBLEM — E78.5 HYPERLIPIDEMIA: Status: ACTIVE | Noted: 2023-01-23

## 2023-01-23 PROBLEM — C50.912 MALIGNANT NEOPLASM OF LEFT BREAST IN FEMALE, ESTROGEN RECEPTOR POSITIVE: Status: ACTIVE | Noted: 2017-11-30

## 2023-01-23 PROBLEM — M16.9 OSTEOARTHRITIS OF HIP: Status: ACTIVE | Noted: 2023-01-23

## 2023-01-23 PROBLEM — R73.03 PREDIABETES: Status: ACTIVE | Noted: 2023-01-23

## 2023-01-23 PROBLEM — Z96.659 HISTORY OF TOTAL KNEE REPLACEMENT: Status: ACTIVE | Noted: 2023-01-23

## 2023-01-23 PROBLEM — I25.10 ARTERIOSCLEROSIS OF CORONARY ARTERY: Status: ACTIVE | Noted: 2017-12-26

## 2023-01-23 PROBLEM — N32.81 OVERACTIVE BLADDER: Status: ACTIVE | Noted: 2023-01-23

## 2023-01-23 PROBLEM — K21.9 GASTROESOPHAGEAL REFLUX DISEASE: Status: ACTIVE | Noted: 2023-01-23

## 2023-01-23 PROBLEM — R26.9 ABNORMAL GAIT: Status: ACTIVE | Noted: 2023-01-23

## 2023-01-23 LAB
ALBUMIN SERPL-MCNC: 4 G/DL (ref 3.4–4.8)
ALBUMIN/GLOB SERPL: 1.2 RATIO (ref 1.1–2)
ALP SERPL-CCNC: 93 UNIT/L (ref 40–150)
ALT SERPL-CCNC: 16 UNIT/L (ref 0–55)
AST SERPL-CCNC: 19 UNIT/L (ref 5–34)
BASOPHILS # BLD AUTO: 0.06 X10(3)/MCL (ref 0–0.2)
BASOPHILS NFR BLD AUTO: 0.7 %
BILIRUBIN DIRECT+TOT PNL SERPL-MCNC: 0.6 MG/DL
BUN SERPL-MCNC: 26 MG/DL (ref 9.8–20.1)
CALCIUM SERPL-MCNC: 10.1 MG/DL (ref 8.4–10.2)
CHLORIDE SERPL-SCNC: 103 MMOL/L (ref 98–107)
CHOLEST SERPL-MCNC: 168 MG/DL
CHOLEST/HDLC SERPL: 3 {RATIO} (ref 0–5)
CO2 SERPL-SCNC: 29 MMOL/L (ref 23–31)
CREAT SERPL-MCNC: 1.16 MG/DL (ref 0.55–1.02)
EOSINOPHIL # BLD AUTO: 0.19 X10(3)/MCL (ref 0–0.9)
EOSINOPHIL NFR BLD AUTO: 2.4 %
ERYTHROCYTE [DISTWIDTH] IN BLOOD BY AUTOMATED COUNT: 13.6 % (ref 11.5–17)
FOLATE SERPL-MCNC: 19.1 NG/ML (ref 7–31.4)
GFR SERPLBLD CREATININE-BSD FMLA CKD-EPI: 47 MLS/MIN/1.73/M2
GLOBULIN SER-MCNC: 3.3 GM/DL (ref 2.4–3.5)
GLUCOSE SERPL-MCNC: 100 MG/DL (ref 82–115)
HCT VFR BLD AUTO: 41.7 % (ref 37–47)
HDLC SERPL-MCNC: 53 MG/DL (ref 35–60)
HGB BLD-MCNC: 13.8 GM/DL (ref 12–16)
IMM GRANULOCYTES # BLD AUTO: 0.01 X10(3)/MCL (ref 0–0.04)
IMM GRANULOCYTES NFR BLD AUTO: 0.1 %
LDLC SERPL CALC-MCNC: 87 MG/DL (ref 50–140)
LYMPHOCYTES # BLD AUTO: 1.58 X10(3)/MCL (ref 0.6–4.6)
LYMPHOCYTES NFR BLD AUTO: 19.7 %
MCH RBC QN AUTO: 30.5 PG
MCHC RBC AUTO-ENTMCNC: 33.1 MG/DL (ref 33–36)
MCV RBC AUTO: 92.1 FL (ref 80–94)
MONOCYTES # BLD AUTO: 0.61 X10(3)/MCL (ref 0.1–1.3)
MONOCYTES NFR BLD AUTO: 7.6 %
NEUTROPHILS # BLD AUTO: 5.57 X10(3)/MCL (ref 2.1–9.2)
NEUTROPHILS NFR BLD AUTO: 69.5 %
NRBC BLD AUTO-RTO: 0 %
PLATELET # BLD AUTO: 196 X10(3)/MCL (ref 130–400)
PMV BLD AUTO: 11.6 FL (ref 7.4–10.4)
POTASSIUM SERPL-SCNC: 3.9 MMOL/L (ref 3.5–5.1)
PROT SERPL-MCNC: 7.3 GM/DL (ref 5.8–7.6)
RBC # BLD AUTO: 4.53 X10(6)/MCL (ref 4.2–5.4)
SODIUM SERPL-SCNC: 142 MMOL/L (ref 136–145)
TRIGL SERPL-MCNC: 140 MG/DL (ref 37–140)
TSH SERPL-ACNC: 0.94 UIU/ML (ref 0.35–4.94)
VIT B12 SERPL-MCNC: 889 PG/ML (ref 213–816)
VLDLC SERPL CALC-MCNC: 28 MG/DL
WBC # SPEC AUTO: 8 X10(3)/MCL (ref 4.5–11.5)

## 2023-01-23 PROCEDURE — 82746 ASSAY OF FOLIC ACID SERUM: CPT

## 2023-01-23 PROCEDURE — 36415 COLL VENOUS BLD VENIPUNCTURE: CPT

## 2023-01-23 PROCEDURE — G0439 PPPS, SUBSEQ VISIT: HCPCS | Mod: ,,, | Performed by: FAMILY MEDICINE

## 2023-01-23 PROCEDURE — 85025 COMPLETE CBC W/AUTO DIFF WBC: CPT

## 2023-01-23 PROCEDURE — 82607 VITAMIN B-12: CPT

## 2023-01-23 PROCEDURE — 80061 LIPID PANEL: CPT

## 2023-01-23 PROCEDURE — 84443 ASSAY THYROID STIM HORMONE: CPT

## 2023-01-23 PROCEDURE — 80053 COMPREHEN METABOLIC PANEL: CPT

## 2023-01-23 PROCEDURE — G0439 PR MEDICARE ANNUAL WELLNESS SUBSEQUENT VISIT: ICD-10-PCS | Mod: ,,, | Performed by: FAMILY MEDICINE

## 2023-01-23 NOTE — PROGRESS NOTES
Patient ID: [id number]     Chief Complaint: Medicare AWV        HPI:     Luis Menjivar is a 82 y.o. female here today for a Medicare Wellness. Has bilateral lower extremity neuropathy.             ----------------------------  Chronic lower back pain  CKD (chronic kidney disease) stage 3, GFR 30-59 ml/min  Coronary arteriosclerosis  Family history of breast cancer in mother  GERD (gastroesophageal reflux disease)  History of total knee arthroplasty  Hyperlipidemia  Hypertension  Impaired gait and mobility  Neuropathy, peripheral  Osteoarthritis of left knee  Osteoarthritis of right hip  Osteopenia  Overactive bladder  Personal history of breast cancer  Postmenopausal  Prediabetes  Trochanteric bursitis, right hip     Past Surgical History:   Procedure Laterality Date    APPENDECTOMY      BLADDER SUSPENSION       SECTION       SECTION      COLONOSCOPY  2017    Dr Wilber Short    HYSTERECTOMY  1968    OPEN REDUCTION AND INTERNAL FIXATION (ORIF) OF FRACTURE OF RADIUS  2006    TONSILLECTOMY      TOTAL KNEE ARTHROPLASTY  2021    Dr Angelito Bowman       Review of patient's allergies indicates:  No Known Allergies    Outpatient Medications Marked as Taking for the 23 encounter (Office Visit) with Jignesh Campos,    Medication Sig Dispense Refill    amLODIPine (NORVASC) 5 MG tablet Take 1 tablet (5 mg total) by mouth once daily. 90 tablet 1    aspirin (ECOTRIN) 81 MG EC tablet Take 81 mg by mouth.      gabapentin (NEURONTIN) 300 MG capsule Take 300 mg by mouth 3 (three) times daily.      lovastatin (MEVACOR) 40 MG tablet Take 1 tablet (40 mg total) by mouth nightly. 90 tablet 1    pantoprazole (PROTONIX) 40 MG tablet       tamsulosin (FLOMAX) 0.4 mg Cap Take 1 capsule (0.4 mg total) by mouth once daily. 90 capsule 2    triamterene-hydrochlorothiazide 37.5-25 mg (MAXZIDE-25) 37.5-25 mg per tablet Take 1 tablet by mouth once daily. 90 tablet 1       Social History      Socioeconomic History    Marital status:    Tobacco Use    Smoking status: Never    Smokeless tobacco: Never   Substance and Sexual Activity    Alcohol use: Never    Drug use: Never    Sexual activity: Not Currently        Family History   Problem Relation Age of Onset    Heart attack Mother     Breast cancer Mother     Hypertension Mother     Lung cancer Father         cause of death    Hypertension Sister     Hypertension Sister     Prostate cancer Brother         Patient Care Team:  Jignesh Campos DO as PCP - General (Family Medicine)  MD Baldomero Rodriguez MD as Consulting Physician (Urology)  Juan Arthur MD as Consulting Physician (Cardiology)       Subjective:     Review of Systems   Constitutional:  Negative for fever.   Respiratory:  Positive for shortness of breath.    Cardiovascular:  Negative for chest pain.   Gastrointestinal:  Negative for constipation, diarrhea and heartburn.   Musculoskeletal:  Negative for falls.   Neurological:  Positive for tingling. Negative for dizziness and headaches.   Psychiatric/Behavioral:  The patient has insomnia.        Patient Reported Health Risk Assessments:  What is your age?: 80 or older  Are you male or female?: Female  During the past four weeks, how much have you been bothered by emotional problems such as feeling anxious, depressed, irritable, sad, or downhearted and blue?: Not at all  During the past five weeks, has your physical and/or emotional health limited your social activities with family, friends, neighbors, or groups?: Not at all  During the past four weeks, how much bodily pain have you generally had?: Mild pain  During the past four weeks, was someone available to help if you needed and wanted help?: Yes, as much as I wanted  During the past four weeks, what was the hardest physical activity you could do for at least two minutes?: Heavy  Can you get to places out of walking distance without help?  (For  example, can you travel alone on buses or taxis, or drive your own car?): Yes  Can you go shopping for groceries or clothes without someone's help?: Yes  Can you prepare your own meals?: Yes  Can you do your own housework without help?: Yes  Because of any health problems, do you need the help of another person with your personal care needs such as eating, bathing, dressing, or getting around the house?: No  Can you handle your own money without help?: Yes  During the past four weeks, how would you rate your health in general?: Very good  How have things been going for you during the past four weeks?: Very well  Are you having difficulties driving your car?: No  Do you always fasten your seat belt when you are in a car?: Yes, usually  How often in the past four weeks have you been bothered by falling or dizzy when standing up?: Never  How often in the past four weeks have you been bothered by sexual problems?: Never  How often in the past four weeks have you been bothered by trouble eating well?: Never  How often in the past four weeks have you been bothered by teeth or denture problems?: Never  How often in the past four weeks have you been bothered with problems using the telephone?: Never  How often in the past four weeks have you been bothered by tiredness or fatigue?: Seldom  Have you fallen two or more times in the past year?: No  Are you afraid of falling?: No  Are you a smoker?: No  During the past four weeks, how many drinks of wine, beer, or other alcoholic beverages did you have?: No alcohol at all  Do you exercise for about 20 minutes three or more days a week?: Yes, most of the time  Have you been given any information to help you with hazards in your house that might hurt you?: Yes  Have you been given any information to help you with keeping track of your medications?: Yes  How often do you have trouble taking medicines the way you've been told to take them?: I always take them as prescribed  How  "confident are you that you can control and manage most of your health problems?: Very confident  What is your race? (Check all that apply.):     Objective:     /72   Pulse 68   Temp 97.2 °F (36.2 °C) (Tympanic)   Resp 18   Ht 5' 8.9" (1.75 m)   Wt 93.4 kg (205 lb 12.8 oz)   SpO2 96%   BMI 30.48 kg/m²     Physical Exam  Vitals reviewed.   Constitutional:       General: She is not in acute distress.     Appearance: Normal appearance.   Cardiovascular:      Rate and Rhythm: Normal rate and regular rhythm.      Heart sounds: No murmur heard.    No friction rub. No gallop.   Pulmonary:      Effort: No respiratory distress.      Breath sounds: No wheezing, rhonchi or rales.   Musculoskeletal:         General: No swelling, tenderness or deformity.      Right lower leg: No edema.      Left lower leg: No edema.   Skin:     General: Skin is warm and dry.      Findings: No lesion or rash.   Neurological:      General: No focal deficit present.      Mental Status: She is alert.   Psychiatric:         Mood and Affect: Mood normal.           Assessment:       ICD-10-CM ICD-9-CM   1. Routine general medical examination at a health care facility  Z00.00 V70.0   2. Neuropathy  G62.9 355.9        Plan:       Medicare Annual Wellness and Personalized Prevention Plan:     Fall Risk + Home Safety + Living Situation + Whisper Test + Depression Screen + CAGE + Cognitive Impairment Screen + ADL Screen + Timed Get Up and Go + Nutrition Screen + PAQ Screen + Health Risk Assessment all reviewed.     No flowsheet data found.  Fall Risk Assessment - Outpatient 1/23/2023 9/7/2022 7/12/2022 6/13/2022   Mobility Status Ambulatory Ambulatory Ambulatory Ambulatory   Number of falls 0 0 0 0   Identified as fall risk 0 0 0 0                   Depression Screening  Over the past two weeks, has the patient felt down, depressed, or hopeless?: No  Over the past two weeks, has the patient felt little interest or pleasure in doing " things?: No  Functional Ability/Safety Screening  Was the patient's timed Up & Go test unsteady or longer than 30 seconds?: No  Does the patient need help with phone, transportation, shopping, preparing meals, housework, laundry, meds, or managing money?: No  Does the patient's home have rugs in the hallway, lack grab bars in the bathroom, lack handrails on the stairs or have poor lighting?: No  Have you noticed any hearing difficulties?: No  Cognitive Function (Assessed through direct observation with due consideration of information obtained by way of patient reports and/or concerns raised by family, friends, caretakers, or others)    Does the patient repeat questions/statements in the same day?: No  Does the patient have trouble remembering the date, year, and time?: No  Does the patient have difficulty managing finances?: No  Does the patient have a decreased sense of direction?: No      Offer of free  was accepted or rejected?: rejected  If  rejected, why?: Patient states understands English    Alcohol/Tobacco Use - Stressed importance of smoking cessation and limiting alcohol intake.  CVD Risk Factors - Reviewed  Obesity/Physical Activity - Normal BMI. Encouraged daily 30 minute physical activity x 5 days per week.    Opioid Screening: Patient medication list reviewed, patient is not taking prescription opioids. Patient is not using additional opioids than prescribed. Patient is at low risk of substance abuse based on this opioid use history.        Health Maintenance Topics with due status: Not Due       Topic Last Completion Date    DEXA Scan 03/08/2022    Hemoglobin A1c (Prediabetes) 07/11/2022    Lipid Panel 07/11/2022        Eye Exam - Recommend annually  Dental Exam - Recommend biannually  Vaccinations -   Immunization History   Administered Date(s) Administered    Hepatitis A, Adult 10/15/2005, 12/11/2006    Influenza (FLUAD) - Quadrivalent - Adjuvanted - PF *Preferred*  (65+) 10/14/2020    Influenza - High Dose - PF (65 years and older) 11/30/2017, 10/10/2018, 10/21/2019    Influenza - Quadrivalent - High Dose - PF (65 years and older) 10/15/2021    Influenza - Quadrivalent - PF *Preferred* (6 months and older) 11/13/2002, 11/03/2003, 10/25/2004, 10/16/2005, 12/11/2006, 11/05/2007, 10/23/2009, 09/07/2011, 09/28/2012    Influenza - Trivalent - PF (ADULT) 11/13/2002, 11/03/2003, 10/25/2004, 10/16/2005, 12/11/2006, 11/05/2007, 10/23/2009, 09/07/2011, 09/28/2012, 11/16/2015, 09/23/2016, 10/21/2019    Influenza A (H1N1) 2009 Monovalent - IM 01/22/2010    Pneumococcal Conjugate - 13 Valent 10/21/2019, 10/22/2019    Pneumococcal Polysaccharide - 23 Valent 11/05/2007, 10/14/2020, 10/14/2020    Td (ADULT) 01/01/2012    Td (Adult), Unspecified Formulation 11/13/2002    Zoster 10/21/2021, 01/13/2022    Zoster Recombinant 10/21/2021, 01/13/2022        Advance Care Planning   Advanced Care Planning: I offered to discuss Advanced Care Planning, which consists of how you would like to be cared for as you end the near of your natural life.  This includes how to pick a person who would make decisions for you if you were unable to make them for yourself, which is called a Medical Power of . These discussions include what kind of decisions you will make regarding life sustaining measures, such as ventilators and feeding tubes, when faced with a life limiting illness recorded on a living will that they will need to know.  Spent 20 minutes with the patient/family on the importance of Advanced Care Planning.          1. Routine general medical examination at a health care facility  - Comprehensive Metabolic Panel; Future  - TSH; Future  - Lipid Panel; Future    2. Neuropathy  - CBC Auto Differential; Future  - Vitamin B12; Future  - Folate; Future  If labs unremarkable, will order ultrasound of bilateral lower extremities to look for vascular etiology.       Medication List with Changes/Refills    Current Medications    AMLODIPINE (NORVASC) 5 MG TABLET    Take 1 tablet (5 mg total) by mouth once daily.       Start Date: 12/14/2022End Date: --    ASPIRIN (ECOTRIN) 81 MG EC TABLET    Take 81 mg by mouth.       Start Date: --        End Date: --    GABAPENTIN (NEURONTIN) 300 MG CAPSULE    Take 300 mg by mouth 3 (three) times daily.       Start Date: 6/3/2022  End Date: --    LOVASTATIN (MEVACOR) 40 MG TABLET    Take 1 tablet (40 mg total) by mouth nightly.       Start Date: 12/14/2022End Date: --    PANTOPRAZOLE (PROTONIX) 40 MG TABLET           Start Date: 7/3/2022  End Date: --    TAMSULOSIN (FLOMAX) 0.4 MG CAP    Take 1 capsule (0.4 mg total) by mouth once daily.       Start Date: 12/14/2022End Date: --    TRIAMTERENE-HYDROCHLOROTHIAZIDE 37.5-25 MG (MAXZIDE-25) 37.5-25 MG PER TABLET    Take 1 tablet by mouth once daily.       Start Date: 12/14/2022End Date: --          The patient's Health Maintenance was reviewed and the following appears to be due at this time:   Health Maintenance Due   Topic Date Due    COVID-19 Vaccine (1) Never done    TETANUS VACCINE  01/01/2022    Shingles Vaccine (3 of 3) 03/10/2022    Influenza Vaccine (1) 09/01/2022           Follow up in about 6 months (around 7/23/2023) for Follow up. In addition to their scheduled follow up, the patient has also been instructed to follow up on as needed basis.     Provided patient with a 5-10 year written screening schedule and personal prevention plan. Recommendations were developed using the USPSTF age appropriate recommendations. Education, counseling, and referrals were provided as needed. After Visit Summary printed and given to patient which includes a list of additional screenings\tests needed.

## 2023-01-31 ENCOUNTER — TELEPHONE (OUTPATIENT)
Dept: FAMILY MEDICINE | Facility: CLINIC | Age: 83
End: 2023-01-31
Payer: MEDICARE

## 2023-01-31 DIAGNOSIS — G62.9 NEUROPATHY: Primary | ICD-10-CM

## 2023-01-31 NOTE — TELEPHONE ENCOUNTER
----- Message from Jignesh Campos DO sent at 1/31/2023 11:39 AM CST -----  All lab results within acceptable ranges. Ordered bilateral lower extremity ultrasound as previously discussed.

## 2023-02-03 ENCOUNTER — HOSPITAL ENCOUNTER (OUTPATIENT)
Dept: RADIOLOGY | Facility: HOSPITAL | Age: 83
Discharge: HOME | End: 2023-02-03
Attending: FAMILY MEDICINE
Payer: MEDICARE

## 2023-02-03 DIAGNOSIS — G62.9 NEUROPATHY: ICD-10-CM

## 2023-02-03 PROCEDURE — 93925 LOWER EXTREMITY STUDY: CPT | Mod: TC

## 2023-02-07 ENCOUNTER — TELEPHONE (OUTPATIENT)
Dept: FAMILY MEDICINE | Facility: CLINIC | Age: 83
End: 2023-02-07
Payer: MEDICARE

## 2023-02-07 NOTE — TELEPHONE ENCOUNTER
----- Message from Jignesh Campos DO sent at 2/7/2023 11:58 AM CST -----  All lab results within acceptable ranges. Right leg normal except for small cyst behind right knee, unlikely to be causing symptoms. NO results for left leg.

## 2023-03-29 ENCOUNTER — APPOINTMENT (OUTPATIENT)
Dept: LAB | Facility: HOSPITAL | Age: 83
End: 2023-03-29
Attending: UROLOGY
Payer: MEDICARE

## 2023-03-29 DIAGNOSIS — N30.21 OTHER CHRONIC CYSTITIS WITH HEMATURIA: Primary | ICD-10-CM

## 2023-03-29 LAB
APPEARANCE UR: ABNORMAL
BACTERIA #/AREA URNS AUTO: ABNORMAL /HPF
BILIRUB UR QL STRIP.AUTO: NEGATIVE MG/DL
COLOR UR AUTO: YELLOW
GLUCOSE UR QL STRIP.AUTO: NEGATIVE MG/DL
KETONES UR QL STRIP.AUTO: NEGATIVE MG/DL
LEUKOCYTE ESTERASE UR QL STRIP.AUTO: ABNORMAL UNIT/L
NITRITE UR QL STRIP.AUTO: NEGATIVE
PH UR STRIP.AUTO: 7 [PH]
PROT UR QL STRIP.AUTO: NEGATIVE MG/DL
RBC #/AREA URNS AUTO: ABNORMAL /HPF
RBC UR QL AUTO: ABNORMAL UNIT/L
SP GR UR STRIP.AUTO: 1.02
SQUAMOUS #/AREA URNS AUTO: ABNORMAL /HPF
UROBILINOGEN UR STRIP-ACNC: 0.2 MG/DL
WBC #/AREA URNS AUTO: ABNORMAL /HPF

## 2023-03-29 PROCEDURE — 87077 CULTURE AEROBIC IDENTIFY: CPT

## 2023-03-29 PROCEDURE — 81001 URINALYSIS AUTO W/SCOPE: CPT

## 2023-04-01 LAB — BACTERIA UR CULT: ABNORMAL

## 2023-05-24 DIAGNOSIS — I10 HYPERTENSION, UNSPECIFIED TYPE: ICD-10-CM

## 2023-05-24 DIAGNOSIS — E78.5 HYPERLIPIDEMIA, UNSPECIFIED HYPERLIPIDEMIA TYPE: ICD-10-CM

## 2023-05-25 RX ORDER — AMLODIPINE BESYLATE 5 MG/1
TABLET ORAL
Qty: 90 TABLET | Refills: 3 | Status: SHIPPED | OUTPATIENT
Start: 2023-05-25

## 2023-05-25 RX ORDER — LOVASTATIN 40 MG/1
TABLET ORAL
Qty: 90 TABLET | Refills: 3 | Status: SHIPPED | OUTPATIENT
Start: 2023-05-25

## 2023-05-25 RX ORDER — TRIAMTERENE/HYDROCHLOROTHIAZID 37.5-25 MG
TABLET ORAL
Qty: 90 TABLET | Refills: 3 | Status: SHIPPED | OUTPATIENT
Start: 2023-05-25

## 2023-07-20 ENCOUNTER — LAB VISIT (OUTPATIENT)
Dept: LAB | Facility: HOSPITAL | Age: 83
End: 2023-07-20
Attending: INTERNAL MEDICINE
Payer: MEDICARE

## 2023-07-20 DIAGNOSIS — E04.1 NONTOXIC UNINODULAR GOITER: Primary | ICD-10-CM

## 2023-07-20 DIAGNOSIS — R73.01 IMPAIRED FASTING GLUCOSE: ICD-10-CM

## 2023-07-20 DIAGNOSIS — I10 ESSENTIAL HYPERTENSION, MALIGNANT: ICD-10-CM

## 2023-07-20 DIAGNOSIS — K21.9 GASTROESOPHAGEAL REFLUX DISEASE, UNSPECIFIED WHETHER ESOPHAGITIS PRESENT: ICD-10-CM

## 2023-07-20 DIAGNOSIS — E78.2 MIXED HYPERLIPIDEMIA: ICD-10-CM

## 2023-07-20 LAB
ALBUMIN SERPL-MCNC: 3.4 G/DL (ref 3.4–4.8)
ALBUMIN/GLOB SERPL: 1 RATIO (ref 1.1–2)
ALP SERPL-CCNC: 87 UNIT/L (ref 40–150)
ALT SERPL-CCNC: 19 UNIT/L (ref 0–55)
AST SERPL-CCNC: 17 UNIT/L (ref 5–34)
BASOPHILS # BLD AUTO: 0.06 X10(3)/MCL
BASOPHILS NFR BLD AUTO: 0.7 %
BILIRUBIN DIRECT+TOT PNL SERPL-MCNC: 0.6 MG/DL
BUN SERPL-MCNC: 20 MG/DL (ref 9.8–20.1)
CALCIUM SERPL-MCNC: 9.5 MG/DL (ref 8.4–10.2)
CHLORIDE SERPL-SCNC: 104 MMOL/L (ref 98–107)
CHOLEST SERPL-MCNC: 172 MG/DL
CHOLEST/HDLC SERPL: 4 {RATIO} (ref 0–5)
CO2 SERPL-SCNC: 31 MMOL/L (ref 23–31)
CREAT SERPL-MCNC: 1.05 MG/DL (ref 0.55–1.02)
EOSINOPHIL # BLD AUTO: 0.34 X10(3)/MCL (ref 0–0.9)
EOSINOPHIL NFR BLD AUTO: 4.1 %
ERYTHROCYTE [DISTWIDTH] IN BLOOD BY AUTOMATED COUNT: 13.9 % (ref 11.5–17)
EST. AVERAGE GLUCOSE BLD GHB EST-MCNC: 131.2 MG/DL
GFR SERPLBLD CREATININE-BSD FMLA CKD-EPI: 53 MLS/MIN/1.73/M2
GLOBULIN SER-MCNC: 3.3 GM/DL (ref 2.4–3.5)
GLUCOSE SERPL-MCNC: 119 MG/DL (ref 82–115)
HBA1C MFR BLD: 6.2 %
HCT VFR BLD AUTO: 42.6 % (ref 37–47)
HDLC SERPL-MCNC: 42 MG/DL (ref 35–60)
HGB BLD-MCNC: 13.6 G/DL (ref 12–16)
IMM GRANULOCYTES # BLD AUTO: 0.04 X10(3)/MCL (ref 0–0.04)
IMM GRANULOCYTES NFR BLD AUTO: 0.5 %
LDLC SERPL CALC-MCNC: 81 MG/DL (ref 50–140)
LYMPHOCYTES # BLD AUTO: 1.55 X10(3)/MCL (ref 0.6–4.6)
LYMPHOCYTES NFR BLD AUTO: 18.8 %
MCH RBC QN AUTO: 29.9 PG (ref 27–31)
MCHC RBC AUTO-ENTMCNC: 31.9 G/DL (ref 33–36)
MCV RBC AUTO: 93.6 FL (ref 80–94)
MONOCYTES # BLD AUTO: 0.58 X10(3)/MCL (ref 0.1–1.3)
MONOCYTES NFR BLD AUTO: 7 %
NEUTROPHILS # BLD AUTO: 5.68 X10(3)/MCL (ref 2.1–9.2)
NEUTROPHILS NFR BLD AUTO: 68.9 %
NRBC BLD AUTO-RTO: 0 %
PLATELET # BLD AUTO: 207 X10(3)/MCL (ref 130–400)
PMV BLD AUTO: 11.8 FL (ref 7.4–10.4)
POTASSIUM SERPL-SCNC: 3.9 MMOL/L (ref 3.5–5.1)
PROT SERPL-MCNC: 6.7 GM/DL (ref 5.8–7.6)
RBC # BLD AUTO: 4.55 X10(6)/MCL (ref 4.2–5.4)
SODIUM SERPL-SCNC: 143 MMOL/L (ref 136–145)
T4 FREE SERPL-MCNC: 0.98 NG/DL (ref 0.7–1.48)
TRIGL SERPL-MCNC: 245 MG/DL (ref 37–140)
TSH SERPL-ACNC: 1.12 UIU/ML (ref 0.35–4.94)
VLDLC SERPL CALC-MCNC: 49 MG/DL
WBC # SPEC AUTO: 8.25 X10(3)/MCL (ref 4.5–11.5)

## 2023-07-20 PROCEDURE — 80053 COMPREHEN METABOLIC PANEL: CPT

## 2023-07-20 PROCEDURE — 84443 ASSAY THYROID STIM HORMONE: CPT

## 2023-07-20 PROCEDURE — 80061 LIPID PANEL: CPT

## 2023-07-20 PROCEDURE — 83036 HEMOGLOBIN GLYCOSYLATED A1C: CPT

## 2023-07-20 PROCEDURE — 36415 COLL VENOUS BLD VENIPUNCTURE: CPT

## 2023-07-20 PROCEDURE — 84439 ASSAY OF FREE THYROXINE: CPT

## 2023-07-20 PROCEDURE — 85025 COMPLETE CBC W/AUTO DIFF WBC: CPT

## 2023-07-24 ENCOUNTER — HOSPITAL ENCOUNTER (OUTPATIENT)
Dept: RADIOLOGY | Facility: HOSPITAL | Age: 83
Discharge: HOME | End: 2023-07-24
Attending: INTERNAL MEDICINE
Payer: MEDICARE

## 2023-07-24 DIAGNOSIS — E04.1 THYROID NODULE: ICD-10-CM

## 2023-07-24 PROCEDURE — 76536 US EXAM OF HEAD AND NECK: CPT | Mod: TC

## 2023-08-09 ENCOUNTER — APPOINTMENT (OUTPATIENT)
Dept: LAB | Facility: HOSPITAL | Age: 83
End: 2023-08-09
Attending: UROLOGY
Payer: MEDICARE

## 2023-08-09 DIAGNOSIS — R35.0 FREQUENT URINATION: Primary | ICD-10-CM

## 2023-08-09 LAB
APPEARANCE UR: CLEAR
BACTERIA #/AREA URNS AUTO: ABNORMAL /HPF
BILIRUB UR QL STRIP.AUTO: NEGATIVE
COLOR UR: YELLOW
GLUCOSE UR QL STRIP.AUTO: NEGATIVE
KETONES UR QL STRIP.AUTO: NEGATIVE
LEUKOCYTE ESTERASE UR QL STRIP.AUTO: ABNORMAL
NITRITE UR QL STRIP.AUTO: NEGATIVE
PH UR STRIP.AUTO: 7.5 [PH]
PROT UR QL STRIP.AUTO: NEGATIVE
RBC #/AREA URNS AUTO: ABNORMAL /HPF
RBC UR QL AUTO: ABNORMAL
SP GR UR STRIP.AUTO: 1.01
SQUAMOUS #/AREA URNS AUTO: ABNORMAL /HPF
UROBILINOGEN UR STRIP-ACNC: 0.2
WBC #/AREA URNS AUTO: >100 /HPF

## 2023-08-09 PROCEDURE — 81001 URINALYSIS AUTO W/SCOPE: CPT

## 2023-08-09 PROCEDURE — 87088 URINE BACTERIA CULTURE: CPT

## 2023-08-09 PROCEDURE — 87186 SC STD MICRODIL/AGAR DIL: CPT

## 2023-08-11 LAB — BACTERIA UR CULT: ABNORMAL

## 2024-01-23 ENCOUNTER — LAB VISIT (OUTPATIENT)
Dept: LAB | Facility: HOSPITAL | Age: 84
End: 2024-01-23
Attending: INTERNAL MEDICINE
Payer: MEDICARE

## 2024-01-23 DIAGNOSIS — I10 ESSENTIAL HYPERTENSION, MALIGNANT: ICD-10-CM

## 2024-01-23 DIAGNOSIS — E78.5 HYPERLIPEMIA: Primary | ICD-10-CM

## 2024-01-23 DIAGNOSIS — R73.01 IMPAIRED FASTING GLUCOSE: ICD-10-CM

## 2024-01-23 LAB
ALBUMIN SERPL-MCNC: 3.7 G/DL (ref 3.4–4.8)
ALBUMIN/GLOB SERPL: 1.2 RATIO (ref 1.1–2)
ALP SERPL-CCNC: 83 UNIT/L (ref 40–150)
ALT SERPL-CCNC: 16 UNIT/L (ref 0–55)
AST SERPL-CCNC: 17 UNIT/L (ref 5–34)
BILIRUB SERPL-MCNC: 0.5 MG/DL
BUN SERPL-MCNC: 33 MG/DL (ref 9.8–20.1)
CALCIUM SERPL-MCNC: 9.3 MG/DL (ref 8.4–10.2)
CHLORIDE SERPL-SCNC: 103 MMOL/L (ref 98–107)
CHOLEST SERPL-MCNC: 156 MG/DL
CHOLEST/HDLC SERPL: 3 {RATIO} (ref 0–5)
CO2 SERPL-SCNC: 32 MMOL/L (ref 23–31)
CREAT SERPL-MCNC: 1.01 MG/DL (ref 0.55–1.02)
EST. AVERAGE GLUCOSE BLD GHB EST-MCNC: 125.5 MG/DL
GFR SERPLBLD CREATININE-BSD FMLA CKD-EPI: 55 MLS/MIN/1.73/M2
GLOBULIN SER-MCNC: 3 GM/DL (ref 2.4–3.5)
GLUCOSE SERPL-MCNC: 106 MG/DL (ref 82–115)
HBA1C MFR BLD: 6 %
HDLC SERPL-MCNC: 48 MG/DL (ref 35–60)
LDLC SERPL CALC-MCNC: 86 MG/DL (ref 50–140)
POTASSIUM SERPL-SCNC: 3.7 MMOL/L (ref 3.5–5.1)
PROT SERPL-MCNC: 6.7 GM/DL (ref 5.8–7.6)
SODIUM SERPL-SCNC: 142 MMOL/L (ref 136–145)
TRIGL SERPL-MCNC: 112 MG/DL (ref 37–140)
VLDLC SERPL CALC-MCNC: 22 MG/DL

## 2024-01-23 PROCEDURE — 36415 COLL VENOUS BLD VENIPUNCTURE: CPT

## 2024-01-23 PROCEDURE — 83036 HEMOGLOBIN GLYCOSYLATED A1C: CPT

## 2024-01-23 PROCEDURE — 80061 LIPID PANEL: CPT

## 2024-01-23 PROCEDURE — 80053 COMPREHEN METABOLIC PANEL: CPT

## 2024-02-21 ENCOUNTER — HOSPITAL ENCOUNTER (OUTPATIENT)
Dept: RADIOLOGY | Facility: CLINIC | Age: 84
Discharge: HOME | End: 2024-02-21
Attending: PHYSICIAN ASSISTANT
Payer: MEDICARE

## 2024-02-21 ENCOUNTER — OFFICE VISIT (OUTPATIENT)
Dept: ORTHOPEDICS | Facility: CLINIC | Age: 84
End: 2024-02-21
Payer: MEDICARE

## 2024-02-21 VITALS
SYSTOLIC BLOOD PRESSURE: 136 MMHG | BODY MASS INDEX: 29.77 KG/M2 | HEART RATE: 66 BPM | HEIGHT: 69 IN | WEIGHT: 201 LBS | DIASTOLIC BLOOD PRESSURE: 73 MMHG

## 2024-02-21 DIAGNOSIS — M25.561 RIGHT KNEE PAIN, UNSPECIFIED CHRONICITY: ICD-10-CM

## 2024-02-21 DIAGNOSIS — M17.11 PRIMARY OSTEOARTHRITIS OF RIGHT KNEE: Primary | ICD-10-CM

## 2024-02-21 PROCEDURE — 73564 X-RAY EXAM KNEE 4 OR MORE: CPT | Mod: RT,,, | Performed by: PHYSICIAN ASSISTANT

## 2024-02-21 PROCEDURE — 99213 OFFICE O/P EST LOW 20 MIN: CPT | Mod: 25,,, | Performed by: PHYSICIAN ASSISTANT

## 2024-02-21 PROCEDURE — 20610 DRAIN/INJ JOINT/BURSA W/O US: CPT | Mod: RT,,, | Performed by: PHYSICIAN ASSISTANT

## 2024-02-21 RX ORDER — METHYLPREDNISOLONE ACETATE 80 MG/ML
80 INJECTION, SUSPENSION INTRA-ARTICULAR; INTRALESIONAL; INTRAMUSCULAR; SOFT TISSUE
Status: DISCONTINUED | OUTPATIENT
Start: 2024-02-21 | End: 2024-02-21 | Stop reason: HOSPADM

## 2024-02-21 RX ADMIN — METHYLPREDNISOLONE ACETATE 80 MG: 80 INJECTION, SUSPENSION INTRA-ARTICULAR; INTRALESIONAL; INTRAMUSCULAR; SOFT TISSUE at 01:02

## 2024-02-21 NOTE — PROGRESS NOTES
Chief Complaint:   Chief Complaint   Patient presents with    Right Knee - Pain     RT knee pain that started about 2 weeks ago. No prior sx. Complaints of pain and limited ROM. States that she has tried tylenol/ibuprofen and ice but no relief.        History of present illness:    83-year-old female presents office today for evaluation of her right knee pain.  She noted some pain and swelling in the anterior and posterior aspect of the knee.  She denies any injury.  She denies any locking, catching giving way episodes.  She does admit some swelling.  She has received cortisone injections in the past with relief.    Past Medical History:   Diagnosis Date    Chronic lower back pain     CKD (chronic kidney disease) stage 3, GFR 30-59 ml/min     Coronary arteriosclerosis     Family history of breast cancer in mother     GERD (gastroesophageal reflux disease)     History of total knee arthroplasty     Hyperlipidemia     Hypertension     Impaired gait and mobility     Neuropathy, peripheral     Osteoarthritis of left knee     Osteoarthritis of right hip     Osteopenia     Overactive bladder     Personal history of breast cancer     Postmenopausal     Prediabetes     Trochanteric bursitis, right hip        Past Surgical History:   Procedure Laterality Date    APPENDECTOMY      BLADDER SUSPENSION       SECTION       SECTION      COLONOSCOPY  2017    Dr Wilber Short    HYSTERECTOMY  1968    OPEN REDUCTION AND INTERNAL FIXATION (ORIF) OF FRACTURE OF RADIUS  2006    TONSILLECTOMY  1943    TOTAL KNEE ARTHROPLASTY  2021    Dr Angelito Bowman       Current Outpatient Medications   Medication Sig    amLODIPine (NORVASC) 5 MG tablet TAKE 1 TABLET DAILY    aspirin (ECOTRIN) 81 MG EC tablet Take 81 mg by mouth.    gabapentin (NEURONTIN) 300 MG capsule Take 300 mg by mouth 3 (three) times daily.    lovastatin (MEVACOR) 40 MG tablet TAKE 1 TABLET NIGHTLY    pantoprazole (PROTONIX) 40 MG  "tablet     tamsulosin (FLOMAX) 0.4 mg Cap Take 1 capsule (0.4 mg total) by mouth once daily.    triamterene-hydrochlorothiazide 37.5-25 mg (MAXZIDE-25) 37.5-25 mg per tablet TAKE 1 TABLET DAILY     No current facility-administered medications for this visit.       Review of patient's allergies indicates:  No Known Allergies    Family History   Problem Relation Age of Onset    Heart attack Mother     Breast cancer Mother     Hypertension Mother     Lung cancer Father         cause of death    Hypertension Sister     Hypertension Sister     Prostate cancer Brother        Social History     Socioeconomic History    Marital status:    Tobacco Use    Smoking status: Never    Smokeless tobacco: Never   Substance and Sexual Activity    Alcohol use: Never    Drug use: Never    Sexual activity: Not Currently         Review of Systems:    Constitution:   Denies chills, fever, and sweats.  HENT:   Denies headaches or blurry vision.  Cardiovascular:  Denies chest pain or irregular heart beat.  Respiratory:   Denies cough or shortness of breath.  Gastrointestinal:  Denies abdominal pain, nausea, or vomiting.  Musculoskeletal:   Denies muscle cramps.  Neurological:   Denies dizziness or focal weakness.  Psychiatric/Behavior: Normal mental status.  Hematology/Lymph:  Denies bleeding problem or easy bruising/bleeding.  Skin:    Denies rash or suspicious lesions.    Examination:    Vital Signs:    Vitals:    02/21/24 1327 02/21/24 1328   BP: 136/73    Pulse: 66    Weight: 91.2 kg (201 lb)    Height: 5' 8.9" (1.75 m)    PainSc:    6       Body mass index is 29.77 kg/m².    Constitution:   Well-developed, well nourished patient in no acute distress.  Neurological:   Alert and oriented x 3 and cooperative to examination.     Psychiatric/Behavior: Normal mental status.  Respiratory:   No shortness of breath.  Eyes:    Extraoccular muscles intact  Skin:    No scars, rash or suspicious lesions.    Physical Exam:     Right knee exam "   Mild effusion but no erythema   Mild swelling in the popliteal fossa consistent with small Baker cyst  She has patellofemoral crepitance noted   She has pain with patellar compression   Mild medial joint line tenderness   No lateral joint line tenderness  Negative medial and lateral Latasha   Range of motion is from 5-120 degrees   Her knee is ligamentously stable   Neurovascular intact distally     Right knee radiographs, four views taken office today show moderate joint space narrowing in the patellofemoral compartment.  She does have maintained medial and lateral compartments.  No fracture seen        Assessment:     Primary osteoarthritis of right knee  -     X-Ray Knee Complete 4 Or More Views Right; Future; Expected date: 02/21/2024  -     Large Joint Aspiration/Injection: R knee        Plan:      I discussed exam and x-ray findings with the patient and her daughter today.  She does have moderate patellofemoral compartment osteoarthritis.  I do recommend a corticosteroid injection today as well as some home exercises.  We discussed ice and over-the-counter NSAIDs.  She will follow-up with us as needed        No follow-ups on file.    DISCLAIMER: This note may have been dictated using voice recognition software and may contain grammatical errors.

## 2024-02-21 NOTE — PROCEDURES
Large Joint Aspiration/Injection: R knee    Date/Time: 2/21/2024 1:45 PM    Performed by: Edison Daniels PA  Authorized by: Edison Daniels PA    Consent Done?:  Yes (Verbal)  Indications:  Arthritis  Timeout: prior to procedure the correct patient, procedure, and site was verified    Prep: patient was prepped and draped in usual sterile fashion      Local anesthesia used?: Yes    Local anesthetic:  Lidocaine 2% without epinephrine    Details:  Needle Size:  25 G  Ultrasonic Guidance for needle placement?: No    Location:  Knee  Site:  R knee  Medications:  80 mg methylPREDNISolone acetate 80 mg/mL  Patient tolerance:  Patient tolerated the procedure well with no immediate complications

## 2024-03-04 ENCOUNTER — TELEPHONE (OUTPATIENT)
Dept: ORTHOPEDICS | Facility: CLINIC | Age: 84
End: 2024-03-04
Payer: MEDICARE

## 2024-03-04 DIAGNOSIS — M17.11 PRIMARY OSTEOARTHRITIS OF RIGHT KNEE: Primary | ICD-10-CM

## 2024-03-04 NOTE — TELEPHONE ENCOUNTER
Patient called stating that she is having right knee pain since her cortisone injection on 02/21/2024. She stated that she has tried tylenol/ibuprofen, ice, and elevation but no relief. She stated that she is ambulating with a walker.     I let her know that I will relay this message. I put her on hold.     Edison said for her to stop tylenol/ibuprofen and start Advil OTC with ice and elevation. If no relief then we can try a synvisc-one injection.     I relayed this message to the patient.     She verbalized a clear understanding.

## 2024-03-06 RX ORDER — DICLOFENAC SODIUM 75 MG/1
75 TABLET, DELAYED RELEASE ORAL 2 TIMES DAILY
Qty: 60 TABLET | Refills: 0 | Status: SHIPPED | OUTPATIENT
Start: 2024-03-06 | End: 2024-04-05

## 2024-03-06 NOTE — TELEPHONE ENCOUNTER
Patient called back stating that her pain is still the same and she can barely walk.     I spoke with Edison and he would like for her to try Diclofenac.     I called the patient to relay this message.     She verbalized a clear understanding.

## 2024-03-08 NOTE — TELEPHONE ENCOUNTER
Patient called back stating that she has had 3 doses of the script diclofenac but no relief.     I spoke with Edison and he would like to have her come in next week to be evaluated to make sure nothing is wrong.     I called the patient back and relayed the message.     She verbalized a clear understanding.

## 2024-03-14 ENCOUNTER — OFFICE VISIT (OUTPATIENT)
Dept: ORTHOPEDICS | Facility: CLINIC | Age: 84
End: 2024-03-14
Payer: MEDICARE

## 2024-03-14 VITALS
HEIGHT: 68 IN | WEIGHT: 205.63 LBS | SYSTOLIC BLOOD PRESSURE: 123 MMHG | DIASTOLIC BLOOD PRESSURE: 71 MMHG | BODY MASS INDEX: 31.16 KG/M2 | HEART RATE: 69 BPM

## 2024-03-14 DIAGNOSIS — M17.11 PRIMARY OSTEOARTHRITIS OF RIGHT KNEE: Primary | ICD-10-CM

## 2024-03-14 PROCEDURE — 99213 OFFICE O/P EST LOW 20 MIN: CPT | Mod: ,,, | Performed by: PHYSICIAN ASSISTANT

## 2024-03-14 NOTE — PROGRESS NOTES
Chief Complaint:   Chief Complaint   Patient presents with    Right Knee - Pain     Injection 02/21 did not help. Iced this morning no relief. Pain continues to get worse w swelling and throbbing sensation.        History of present illness:    This is a 83 y.o. year old female who complains of knee pain.    Patient had an injection of cortisone on February 21st of this year which only gave her a couple of days of relief.    She points to the medial aspect of her right knee is the source of her pain she has been using ice and diclofenac without much relief for her   She does have some pain medicine that her primary care physician gave her and she uses this at night to help her sleep.    Denies any fevers redness      Current Outpatient Medications   Medication Sig    amLODIPine (NORVASC) 5 MG tablet TAKE 1 TABLET DAILY    aspirin (ECOTRIN) 81 MG EC tablet Take 81 mg by mouth.    diclofenac (VOLTAREN) 75 MG EC tablet Take 1 tablet (75 mg total) by mouth 2 (two) times daily.    gabapentin (NEURONTIN) 300 MG capsule Take 300 mg by mouth 2 (two) times daily.    lovastatin (MEVACOR) 40 MG tablet TAKE 1 TABLET NIGHTLY    pantoprazole (PROTONIX) 40 MG tablet     tamsulosin (FLOMAX) 0.4 mg Cap Take 1 capsule (0.4 mg total) by mouth once daily.    triamterene-hydrochlorothiazide 37.5-25 mg (MAXZIDE-25) 37.5-25 mg per tablet TAKE 1 TABLET DAILY     No current facility-administered medications for this visit.       Review of Systems:    Constitution:   Denies chills, fever, and sweats.  HENT:   Denies headaches or blurry vision.  Cardiovascular:  Denies chest pain or irregular heart beat.  Respiratory:   Denies cough or shortness of breath.  Gastrointestinal:  Denies abdominal pain, nausea, or vomiting.  Musculoskeletal:   Denies muscle cramps.  Neurological:   Denies dizziness or focal weakness.  Psychiatric/Behavior: Normal mental status.  Hematology/Lymph:  Denies bleeding problem or easy bruising/bleeding.  Skin:    Denies  "rash or suspicious lesions.    Examination:    Vital Signs:    Vitals:    03/14/24 1302   BP: 123/71   Pulse: 69   Weight: 93.3 kg (205 lb 9.6 oz)   Height: 5' 8" (1.727 m)       Body mass index is 31.26 kg/m².    Constitution:   Well-developed, well nourished patient in no acute distress.  Neurological:   Alert and oriented x 3 and cooperative to examination.     Psychiatric/Behavior: Normal mental status.  Respiratory:   No shortness of breath.  Eyes:    Extraoccular muscles intact  Skin:    No scars, rash or suspicious lesions.    Physical Exam:   Right Knee     Grade effusion 1    No erythema, warmth bruising     active flexion 120   active extension 5    Tender over medial joint line  Tender over MCL   No lateral compartment tenderness   Negative  Latasha test   Negative  lachman test   No instability on varus or valgus stress   No weakness of the  knee was observed.        Imaging: X-rays reviewed from previous appointment.    Patient has narrowing medial compartment is near bone-on-bone.    She has a varus aligned leg.           Assessment: Primary osteoarthritis of right knee         Plan:  This point the patient would like to try viscosupplementation injection.    We will see her back in a week start her right knee injections          DISCLAIMER: This note may have been dictated using voice recognition software and may contain grammatical errors.     NOTE: Consult report sent to referring provider via [facility] EMR.  "

## 2024-03-20 ENCOUNTER — OFFICE VISIT (OUTPATIENT)
Dept: ORTHOPEDICS | Facility: CLINIC | Age: 84
End: 2024-03-20
Payer: MEDICARE

## 2024-03-20 VITALS
DIASTOLIC BLOOD PRESSURE: 68 MMHG | WEIGHT: 205.38 LBS | SYSTOLIC BLOOD PRESSURE: 130 MMHG | HEART RATE: 54 BPM | BODY MASS INDEX: 31.13 KG/M2 | HEIGHT: 68 IN

## 2024-03-20 DIAGNOSIS — M17.11 PRIMARY OSTEOARTHRITIS OF RIGHT KNEE: Primary | ICD-10-CM

## 2024-03-20 PROCEDURE — 99499 UNLISTED E&M SERVICE: CPT | Mod: ,,, | Performed by: PHYSICIAN ASSISTANT

## 2024-03-20 PROCEDURE — 20610 DRAIN/INJ JOINT/BURSA W/O US: CPT | Mod: RT,,, | Performed by: PHYSICIAN ASSISTANT

## 2024-03-20 RX ORDER — LIDOCAINE HYDROCHLORIDE 20 MG/ML
2 INJECTION, SOLUTION INFILTRATION; PERINEURAL
Status: DISCONTINUED | OUTPATIENT
Start: 2024-03-20 | End: 2024-03-20 | Stop reason: HOSPADM

## 2024-03-20 RX ADMIN — LIDOCAINE HYDROCHLORIDE 2 ML: 20 INJECTION, SOLUTION INFILTRATION; PERINEURAL at 10:03

## 2024-03-20 NOTE — PROGRESS NOTES
"Chief Complaint:   Chief Complaint   Patient presents with    Right Knee - Injections     Rt knee synvisc #1 3/20/2024       History of present illness:    This is a 83 y.o. year old female who complains of right knee pain  Having Synvsic 1 today      Current Outpatient Medications   Medication Sig    amLODIPine (NORVASC) 5 MG tablet TAKE 1 TABLET DAILY    aspirin (ECOTRIN) 81 MG EC tablet Take 81 mg by mouth.    diclofenac (VOLTAREN) 75 MG EC tablet Take 1 tablet (75 mg total) by mouth 2 (two) times daily.    gabapentin (NEURONTIN) 300 MG capsule Take 300 mg by mouth 2 (two) times daily.    lovastatin (MEVACOR) 40 MG tablet TAKE 1 TABLET NIGHTLY    pantoprazole (PROTONIX) 40 MG tablet     tamsulosin (FLOMAX) 0.4 mg Cap Take 1 capsule (0.4 mg total) by mouth once daily.    triamterene-hydrochlorothiazide 37.5-25 mg (MAXZIDE-25) 37.5-25 mg per tablet TAKE 1 TABLET DAILY     No current facility-administered medications for this visit.       Review of Systems:    Constitution:   Denies chills, fever, and sweats.  HENT:   Denies headaches or blurry vision.  Cardiovascular:  Denies chest pain or irregular heart beat.  Respiratory:   Denies cough or shortness of breath.  Gastrointestinal:  Denies abdominal pain, nausea, or vomiting.  Musculoskeletal:   Denies muscle cramps.  Neurological:   Denies dizziness or focal weakness.  Psychiatric/Behavior: Normal mental status.  Hematology/Lymph:  Denies bleeding problem or easy bruising/bleeding.  Skin:    Denies rash or suspicious lesions.    Examination:    Vital Signs:    Vitals:    03/20/24 1019   BP: 130/68   Pulse: (!) 54   Weight: 93.2 kg (205 lb 6.4 oz)   Height: 5' 8" (1.727 m)       Body mass index is 31.23 kg/m².    Constitution:   Well-developed, well nourished patient in no acute distress.  Neurological:   Alert and oriented x 3 and cooperative to examination.     Psychiatric/Behavior: Normal mental status.  Respiratory:   No shortness of breath.  Eyes: "    Extraoccular muscles intact  Skin:    No scars, rash or suspicious lesions.    Physical Exam:   Patient presents today for Synvsic 1 visco-supplementation injection of the right knee      After verbal consent was obtained, the patient's knee was prepped and sterilely injected with Visco-supplementation.  Band-aid placed.  Patient did well following injection.            Assessment: Primary osteoarthritis of right knee  -     Large Joint Aspiration/Injection         Plan:  f/u 4-6 weeks    Large Joint Aspiration/Injection    Date/Time: 3/20/2024 10:45 AM    Performed by: Nishant Clinton PA-C  Authorized by: Nishant Clinton PA-C    Consent Done?:  Yes (Verbal)  Indications:  Pain  Site marked: the procedure site was marked    Timeout: prior to procedure the correct patient, procedure, and site was verified    Prep: patient was prepped and draped in usual sterile fashion      Local anesthesia used?: Yes    Local anesthetic:  Topical anesthetic and lidocaine 2% without epinephrine  Ultrasonic Guidance for needle placement?: No    Approach:  Superior  Location:  Knee  Medications:  2 mL LIDOcaine HCL 20 mg/ml (2%) 20 mg/mL (2 %); 48 mg hylan g-f 20 48 mg/6 mL  Patient tolerance:  Patient tolerated the procedure well with no immediate complications       DISCLAIMER: This note may have been dictated using voice recognition software and may contain grammatical errors.     NOTE: Consult report sent to referring provider via [employer] E[facility].

## 2024-03-20 NOTE — PROCEDURES
Large Joint Aspiration/Injection    Date/Time: 3/20/2024 10:45 AM    Performed by: Nishant Clinton PA-C  Authorized by: Nishant Clinton PA-C    Consent Done?:  Yes (Verbal)  Indications:  Pain  Site marked: the procedure site was marked    Timeout: prior to procedure the correct patient, procedure, and site was verified    Prep: patient was prepped and draped in usual sterile fashion      Local anesthesia used?: Yes    Local anesthetic:  Topical anesthetic and lidocaine 2% without epinephrine  Ultrasonic Guidance for needle placement?: No    Approach:  Superior  Location:  Knee  Medications:  2 mL LIDOcaine HCL 20 mg/ml (2%) 20 mg/mL (2 %); 48 mg hylan g-f 20 48 mg/6 mL  Patient tolerance:  Patient tolerated the procedure well with no immediate complications

## 2024-04-01 ENCOUNTER — TELEPHONE (OUTPATIENT)
Dept: ORTHOPEDICS | Facility: CLINIC | Age: 84
End: 2024-04-01
Payer: MEDICARE

## 2024-04-01 DIAGNOSIS — M17.11 PRIMARY OSTEOARTHRITIS OF RIGHT KNEE: Primary | ICD-10-CM

## 2024-04-01 RX ORDER — MELOXICAM 7.5 MG/1
7.5 TABLET ORAL DAILY
COMMUNITY
End: 2024-04-01 | Stop reason: SDUPTHER

## 2024-04-01 NOTE — TELEPHONE ENCOUNTER
Patient called Left a VM she is still having a lot of pain from her Gel injection she received back on 3/20/24, doesn't have a follow up until May 13th, she is still icing and elevating  Spoke with patient to see if she had been taking any OTC medications she's been trying advil but its not giving her any relief would like to know if  would call her in something to her preferred pharmacy in the chart did let her know he is finishing up clinic ill send the request and would call back to let her know if he sent it

## 2024-04-02 RX ORDER — MELOXICAM 7.5 MG/1
7.5 TABLET ORAL DAILY
Qty: 14 TABLET | Refills: 0 | Status: SHIPPED | OUTPATIENT
Start: 2024-04-02 | End: 2024-04-16

## 2024-04-22 ENCOUNTER — TELEPHONE (OUTPATIENT)
Dept: ORTHOPEDICS | Facility: CLINIC | Age: 84
End: 2024-04-22
Payer: MEDICARE

## 2024-05-13 ENCOUNTER — OFFICE VISIT (OUTPATIENT)
Dept: ORTHOPEDICS | Facility: CLINIC | Age: 84
End: 2024-05-13
Payer: MEDICARE

## 2024-05-13 VITALS
SYSTOLIC BLOOD PRESSURE: 133 MMHG | WEIGHT: 205.31 LBS | BODY MASS INDEX: 31.12 KG/M2 | HEART RATE: 65 BPM | DIASTOLIC BLOOD PRESSURE: 74 MMHG | HEIGHT: 68 IN

## 2024-05-13 DIAGNOSIS — M17.11 PRIMARY OSTEOARTHRITIS OF RIGHT KNEE: Primary | ICD-10-CM

## 2024-05-13 PROCEDURE — 99213 OFFICE O/P EST LOW 20 MIN: CPT | Mod: ,,, | Performed by: PHYSICIAN ASSISTANT

## 2024-05-13 NOTE — PROGRESS NOTES
Chief Complaint:   Chief Complaint   Patient presents with    Right Knee - Follow-up     Pt states she is doing great. Pt states the injection took 6 weeks to kick in, but now she has no issues or complaints.        History of present illness:    83-year-old female presents office today for follow-up on her right knee.  She received a Synvisc injection 6 weeks ago which did provide relief.  Currently she has no knee pain.  She does have a known history of osteoarthritis.    Past Medical History:   Diagnosis Date    Chronic lower back pain     CKD (chronic kidney disease) stage 3, GFR 30-59 ml/min     Coronary arteriosclerosis     Family history of breast cancer in mother     GERD (gastroesophageal reflux disease)     History of total knee arthroplasty     Hyperlipidemia     Hypertension     Impaired gait and mobility     Neuropathy, peripheral     Osteoarthritis of left knee     Osteoarthritis of right hip     Osteopenia     Overactive bladder     Personal history of breast cancer     Postmenopausal     Prediabetes     Trochanteric bursitis, right hip        Past Surgical History:   Procedure Laterality Date    APPENDECTOMY      BLADDER SUSPENSION       SECTION       SECTION      COLONOSCOPY  2017    Dr Wilber Short    HYSTERECTOMY      OPEN REDUCTION AND INTERNAL FIXATION (ORIF) OF FRACTURE OF RADIUS  2006    TONSILLECTOMY      TOTAL KNEE ARTHROPLASTY  2021    Dr Angelito Bowman       Current Outpatient Medications   Medication Sig    amLODIPine (NORVASC) 5 MG tablet TAKE 1 TABLET DAILY    aspirin (ECOTRIN) 81 MG EC tablet Take 81 mg by mouth.    gabapentin (NEURONTIN) 300 MG capsule Take 300 mg by mouth 2 (two) times daily.    lovastatin (MEVACOR) 40 MG tablet TAKE 1 TABLET NIGHTLY    pantoprazole (PROTONIX) 40 MG tablet     tamsulosin (FLOMAX) 0.4 mg Cap Take 1 capsule (0.4 mg total) by mouth once daily.    triamterene-hydrochlorothiazide 37.5-25 mg (MAXZIDE-25)  "37.5-25 mg per tablet TAKE 1 TABLET DAILY     No current facility-administered medications for this visit.       Review of patient's allergies indicates:  No Known Allergies    Family History   Problem Relation Name Age of Onset    Heart attack Mother      Breast cancer Mother      Hypertension Mother      Lung cancer Father          cause of death    Hypertension Sister      Hypertension Sister      Prostate cancer Brother         Social History     Socioeconomic History    Marital status:    Tobacco Use    Smoking status: Never    Smokeless tobacco: Never   Substance and Sexual Activity    Alcohol use: Never    Drug use: Never    Sexual activity: Not Currently     Partners: Male         Review of Systems:    Constitution:   Denies chills, fever, and sweats.  HENT:   Denies headaches or blurry vision.  Cardiovascular:  Denies chest pain or irregular heart beat.  Respiratory:   Denies cough or shortness of breath.  Gastrointestinal:  Denies abdominal pain, nausea, or vomiting.  Musculoskeletal:   Denies muscle cramps.  Neurological:   Denies dizziness or focal weakness.  Psychiatric/Behavior: Normal mental status.  Hematology/Lymph:  Denies bleeding problem or easy bruising/bleeding.  Skin:    Denies rash or suspicious lesions.    Examination:    Vital Signs:    Vitals:    05/13/24 1451   BP: 133/74   Pulse: 65   Weight: 93.1 kg (205 lb 4.8 oz)   Height: 5' 8" (1.727 m)       Body mass index is 31.22 kg/m².    Constitution:   Well-developed, well nourished patient in no acute distress.  Neurological:   Alert and oriented x 3 and cooperative to examination.     Psychiatric/Behavior: Normal mental status.  Respiratory:   No shortness of breath.  Eyes:    Extraoccular muscles intact  Skin:    No scars, rash or suspicious lesions.    Physical Exam:     Right knee exam   Mild effusion but no erythema   Mild swelling in the popliteal fossa consistent with small Baker cyst  She has patellofemoral crepitance noted "   She has pain with patellar compression   Mild medial joint line tenderness   No lateral joint line tenderness  Negative medial and lateral Latasha   Range of motion is from 5-120 degrees   Her knee is ligamentously stable   Neurovascular intact distally     Right knee radiographs, reviewed show moderate joint space narrowing in the patellofemoral compartment.  She does have maintained medial and lateral compartments.  No fracture seen        Assessment:     Primary osteoarthritis of right knee          Plan:      Continue conservative treatment as it is helping.  She will follow up with us as needed.  She will contact us if she would like another injection such as a cortisone injection if her pain increases.        No follow-ups on file.    DISCLAIMER: This note may have been dictated using voice recognition software and may contain grammatical errors.

## 2024-05-19 DIAGNOSIS — E78.5 HYPERLIPIDEMIA, UNSPECIFIED HYPERLIPIDEMIA TYPE: ICD-10-CM

## 2024-05-19 DIAGNOSIS — I10 HYPERTENSION, UNSPECIFIED TYPE: ICD-10-CM

## 2024-05-20 RX ORDER — LOVASTATIN 40 MG/1
TABLET ORAL
Qty: 90 TABLET | Refills: 3 | Status: SHIPPED | OUTPATIENT
Start: 2024-05-20

## 2024-05-20 RX ORDER — TRIAMTERENE/HYDROCHLOROTHIAZID 37.5-25 MG
TABLET ORAL
Qty: 90 TABLET | Refills: 3 | Status: SHIPPED | OUTPATIENT
Start: 2024-05-20

## 2024-05-20 RX ORDER — AMLODIPINE BESYLATE 5 MG/1
TABLET ORAL
Qty: 90 TABLET | Refills: 3 | Status: SHIPPED | OUTPATIENT
Start: 2024-05-20

## 2024-06-25 ENCOUNTER — APPOINTMENT (OUTPATIENT)
Dept: LAB | Facility: HOSPITAL | Age: 84
End: 2024-06-25
Attending: UROLOGY
Payer: MEDICARE

## 2024-06-25 DIAGNOSIS — R39.14 FEELING OF INCOMPLETE BLADDER EMPTYING: Primary | ICD-10-CM

## 2024-06-25 LAB
BACTERIA #/AREA URNS AUTO: ABNORMAL /HPF
BILIRUB UR QL STRIP.AUTO: NEGATIVE
CLARITY UR: CLEAR
COLOR UR AUTO: YELLOW
GLUCOSE UR QL STRIP: NEGATIVE
HGB UR QL STRIP: ABNORMAL
KETONES UR QL STRIP: NEGATIVE
LEUKOCYTE ESTERASE UR QL STRIP: ABNORMAL
NITRITE UR QL STRIP: NEGATIVE
PH UR STRIP: 7 [PH]
PROT UR QL STRIP: NEGATIVE
RBC #/AREA URNS AUTO: ABNORMAL /HPF
SP GR UR STRIP.AUTO: 1.02 (ref 1–1.03)
SQUAMOUS #/AREA URNS AUTO: ABNORMAL /HPF
UROBILINOGEN UR STRIP-ACNC: 0.2
WBC #/AREA URNS AUTO: ABNORMAL /HPF

## 2024-06-25 PROCEDURE — 87086 URINE CULTURE/COLONY COUNT: CPT

## 2024-06-25 PROCEDURE — 81001 URINALYSIS AUTO W/SCOPE: CPT

## 2024-06-25 PROCEDURE — 81003 URINALYSIS AUTO W/O SCOPE: CPT

## 2024-06-25 PROCEDURE — 87186 SC STD MICRODIL/AGAR DIL: CPT

## 2024-06-27 LAB — BACTERIA UR CULT: ABNORMAL

## 2024-07-09 DIAGNOSIS — I63.9 CEREBRAL VASCULAR ACCIDENT: ICD-10-CM

## 2024-07-09 DIAGNOSIS — R29.90 STROKE-LIKE SYMPTOMS: ICD-10-CM

## 2024-07-09 DIAGNOSIS — R47.01 APHASIA: Primary | ICD-10-CM

## 2024-07-09 NOTE — PROGRESS NOTES
[facility]   Physical Therapy Initial Evaluation     Date: 7/10/2024   Name: Luis Menjivar  Clinic Number: [id number]    Therapy Diagnosis: No diagnosis found.  Physician: Warren Douglas MD    Physician Orders: PT Eval and Treat  Medical Diagnosis from Referral: CVA  Evaluation Date: 7/10/2024  Authorization Period Expiration: medically necessary  Plan of Care Expiration: 10/10/2024  Visit # / Visits authorized: 0/ medically necessary       Time In: 845  Time Out: 930  Total Appointment Time (timed & untimed codes): 45 minutes      SUBJECTIVE     Luis reports:  she is referred following a CVA on July 4.  She reports initially having trouble reading at [employer] and then dropping a glass bowl.  She realized something was wrong and drove herself to the hospital where imaging showed 2 small strokes.      She was in the hospital for a brief time and is now living back at home.  She is independent with all ADL's and getting around independently, but she is not driving until cleared by her physician. She did have an internal cardiac monitor placed that is being monitored for the next few weeks.  She has a follow up appt with her cardiologist next week.    She is noticing a generalized weakness and slight increased fatigue since the stroke, but no specific issues.     She also reports a history of TKA on right and an arthritic knee at the left that gives her problems, even before the stroke.           Medical History:   Past Medical History:   Diagnosis Date    Chronic lower back pain     CKD (chronic kidney disease) stage 3, GFR 30-59 ml/min     Coronary arteriosclerosis     Family history of breast cancer in mother     GERD (gastroesophageal reflux disease)     History of total knee arthroplasty     Hyperlipidemia     Hypertension     Impaired gait and mobility     Neuropathy, peripheral     Osteoarthritis of left knee     Osteoarthritis of right hip     Osteopenia     Overactive  bladder     Personal history of breast cancer     Postmenopausal     Prediabetes     Trochanteric bursitis, right hip        Surgical History:   Luis Menjivar  has a past surgical history that includes Hysterectomy ();  section (); Tonsillectomy (); Total knee arthroplasty (2021); Colonoscopy (2017); Open reduction and internal fixation (ORIF) of fracture of radius (); Bladder suspension (); Appendectomy (); and  section ().    Medications:   Luis has a current medication list which includes the following prescription(s): amlodipine, aspirin, gabapentin, lovastatin, pantoprazole, tamsulosin, and triamterene-hydrochlorothiazide 37.5-25 mg.    Allergies:   Review of patient's allergies indicates:  No Known Allergies     CMS Impairment/Limitation/Restriction for FOTO Survey  Therapist reviewed FOTO scores for Luis Menjivar on 7/10/2024. FOTO documents entered into [facility] - see Media section.  Patient's Physical FS Primary Measure: 59  Risk Adjusted Statistical FOTO: 59  Limitation Score: 41%  Category: Mobility      OBJECTIVE     BP = 170/73, HR = 78    Eval 7/10/2024     Pain    0/10      Posture:     Good upright posture, L lateral shift at lumbar spine during walking     Dermatomes    No numbness or tingling, light touch in tact throughout     Myotomes    L UE and LE slight weakness 4/5 at all muscle groups    HIP FLX - Right 4+/5, Left 4/5    HIP ABD - Right 4+/5, Left 4/5    KNEE FLX - Right 4+/5, Left 4/5  KNEE EXT - Right 5/5, Left 4+/5  ANKLE DF - Right 5/5, Left 4+/5      Shoulder Flexion - Right 5/5, Left 4/5  Shoulder Extension - Right 5/5, Left 4+/5  Shoulder Scaption - Right 5/5, Left 4/5  Shoulder Abduction - Right 5/5, Left 4/5    Elbow flx - Right 5/5, Left 4/5  Elbow ext - Right 5/5, Left 4/5       Coordination    Alt supination/pronation = min to mod difficulty and lag    Alt toe taps = mod difficulty       Functional Testing    5x  STS = 17 sec  2 MWT = 275'     Sit to stand = min difficulty  Bed mobility  Transitional movements    Balance testing    Rhomberg = 15 sec w mild sway  Tandem R for = 4 sec w mod sway  Tandem L for = unable     Gait Analysis:     Antalgic gait pattern w wide based gait left.         TREATMENT        Time Activities   Manual     TherAct     TherEx     Gait     Neuro Re-ed     Modalities     E-Stim     Dry Needling     Canalith Repositioning           PATIENT EDUCATION AND HOME EXERCISES     Education provided:   - Plan of care, HEP, safety with all activity and importance of rest/activity balance      ASSESSMENT     Luis is a 83 y.o. female referred to outpatient Physical Therapy with a medical diagnosis of CVA w left hemiparesis.     Patient presents with slight weakness at left UE and LE and mild neurological lag at UE and LE.  She has difficulty with balance and poor activity tolerance.      Encouraged rest/activity balance for neuro recovery and discussed safety w activity at this time.  Patient states understanding and in agreement.     Patient will benefit from skilled outpatient Physical Therapy to address the deficits stated above and in the chart below, provide education, and to maximize patient's level of independence.    Patient prognosis is Excellent.     Plan of care discussed with patient: Yes  Patient's spiritual, cultural and educational needs considered and patient is agreeable to the plan of care and goals as stated below:     Anticipated Barriers for therapy: advanced age    Goals:  Short Term Goals: 4 weeks   Patient will report at least 10% increase on initial FOTO score to indicate clinically significant functional improvement  Patient will demonstrate equal NM response    Long Term Goals: 12 weeks   Patient will report at least 20% increase on initial FOTO score to indicate clinically significant functional improvement  Patient will demonstrate equal NM response w alt toe taps for safe  functional movement patterns  Patient will demonstrate good balance with testing for safe functional mobility    PLAN   Plan of care Certification: 7/10/2024 to 10/10/2024.    Outpatient Physical Therapy 2-3 times weekly for 12 weeks to include the following interventions: Gait Training, Manual Therapy, Neuromuscular Re-ed, Patient Education, Self Care, Therapeutic Activities, and Therapeutic Exercise.     Emilie Laboy, PT

## 2024-07-10 ENCOUNTER — CLINICAL SUPPORT (OUTPATIENT)
Dept: REHABILITATION | Facility: HOSPITAL | Age: 84
End: 2024-07-10
Payer: MEDICARE

## 2024-07-10 DIAGNOSIS — I63.9 CEREBROVASCULAR ACCIDENT (CVA), UNSPECIFIED MECHANISM: ICD-10-CM

## 2024-07-10 DIAGNOSIS — R29.90 STROKE-LIKE SYMPTOMS: Primary | ICD-10-CM

## 2024-07-10 PROCEDURE — 97162 PT EVAL MOD COMPLEX 30 MIN: CPT

## 2024-07-12 ENCOUNTER — CLINICAL SUPPORT (OUTPATIENT)
Dept: REHABILITATION | Facility: HOSPITAL | Age: 84
End: 2024-07-12
Payer: MEDICARE

## 2024-07-12 DIAGNOSIS — G81.94 LEFT HEMIPARESIS: Primary | ICD-10-CM

## 2024-07-12 PROCEDURE — 97110 THERAPEUTIC EXERCISES: CPT

## 2024-07-12 PROCEDURE — 97112 NEUROMUSCULAR REEDUCATION: CPT

## 2024-07-12 PROCEDURE — 97530 THERAPEUTIC ACTIVITIES: CPT

## 2024-07-12 NOTE — PROGRESS NOTES
Physical Therapy Treatment Note     Name: Luis Menjivar  Clinic Number: [id number]    Therapy Diagnosis:   Encounter Diagnosis   Name Primary?    Left hemiparesis Yes     Physician: Warren Douglas MD    Visit Date: 7/12/2024    Physician Orders: PT Eval and Treat  Medical Diagnosis from Referral: CVA  Evaluation Date: 7/10/2024  Authorization Period Expiration: medically necessary  Plan of Care Expiration: 10/10/2024  Visit # / Visits authorized: 1/ medically necessary         Time In: 845  Time Out: 926  Total Appointment Time (timed & untimed codes): 41 minutes    Subjective     Patient reports: no new complaints or difficulties since evaluation.    CMS Impairment/Limitation/Restriction for FOTO Survey  Therapist reviewed FOTO scores for Luis Menjivar on 7/10/2024. FOTO documents entered into [employer] - see Media section.  Patient's Physical FS Primary Measure: 59  Risk Adjusted Statistical FOTO: 59  Limitation Score: 41%  Category: Mobility    Objective     BP = 123/70    Eval 7/10/2024      Pain     0/10       Posture:      Good upright posture, L lateral shift at lumbar spine during walking      Dermatomes     No numbness or tingling, light touch in tact throughout      Myotomes     L UE and LE slight weakness 4/5 at all muscle groups     HIP FLX - Right 4+/5, Left 4/5     HIP ABD - Right 4+/5, Left 4/5     KNEE FLX - Right 4+/5, Left 4/5  KNEE EXT - Right 5/5, Left 4+/5  ANKLE DF - Right 5/5, Left 4+/5        Shoulder Flexion - Right 5/5, Left 4/5  Shoulder Extension - Right 5/5, Left 4+/5  Shoulder Scaption - Right 5/5, Left 4/5  Shoulder Abduction - Right 5/5, Left 4/5     Elbow flx - Right 5/5, Left 4/5  Elbow ext - Right 5/5, Left 4/5         Coordination     Alt supination/pronation = min to mod difficulty and lag     Alt toe taps = mod difficulty         Functional Testing     5x STS = 17 sec  2 MWT = 275'      Sit to stand = min difficulty  Bed mobility  Transitional movements = slow  deliberate movements     Balance testing     Rhomberg = 15 sec w mild sway  Tandem R for = 4 sec w mod sway  Tandem L for = unable      Gait Analysis:      Antalgic gait pattern w wide based gait left.     Luis received the following treatment:     Time Activities   Manual  min    TherAct 10 min NuStep    TherEx 10 min Generalized L UE and LE strengthening   Gait     Neuro Re-ed 21 min NM rehab   Modalities     E-Stim     Dry Needling     Canalith Repositioning           Home Exercises Provided and Patient Education Provided     Education provided:   -Plan of care, HEP, safety at home and with mobility    Assessment     Exercise focus on NM rehab and normalizing movement patterns at left UE and LE with good response.  Mod difficulty with complex movement warrior pose and foam balance, but able to complete.  VC for correct movement patterns and good corrections and adjustments. Moderate difficulty with left UE coordination during functional TB pulls requiring MC to complete.    Will progress function as able.    Patient prognosis is Good.      Anticipated barriers to physical therapy: advanced age    Goals: Luis Is progressing well towards her goals.  Short Term Goals: 4 weeks   Patient will report at least 10% increase on initial FOTO score to indicate clinically significant functional improvement  Patient will demonstrate equal NM response     Long Term Goals: 12 weeks   Patient will report at least 20% increase on initial FOTO score to indicate clinically significant functional improvement  Patient will demonstrate equal NM response w alt toe taps for safe functional movement patterns  Patient will demonstrate good balance with testing for safe functional mobility  Plan     2-3x/week x 12 weeks     Emilie Laboy, PT

## 2024-07-15 ENCOUNTER — CLINICAL SUPPORT (OUTPATIENT)
Dept: REHABILITATION | Facility: HOSPITAL | Age: 84
End: 2024-07-15
Payer: MEDICARE

## 2024-07-15 DIAGNOSIS — G81.94 LEFT HEMIPARESIS: Primary | ICD-10-CM

## 2024-07-15 PROCEDURE — 97110 THERAPEUTIC EXERCISES: CPT

## 2024-07-15 PROCEDURE — 97112 NEUROMUSCULAR REEDUCATION: CPT

## 2024-07-15 PROCEDURE — 97530 THERAPEUTIC ACTIVITIES: CPT

## 2024-07-15 NOTE — PROGRESS NOTES
Physical Therapy Treatment Note     Name: Luis Menjivar  Clinic Number: [id number]    Therapy Diagnosis:   No diagnosis found.    Physician: Warren Douglas MD    Visit Date: 7/15/2024    Physician Orders: PT Eval and Treat  Medical Diagnosis from Referral: CVA  Evaluation Date: 7/10/2024  Authorization Period Expiration: medically necessary  Plan of Care Expiration: 10/10/2024  Visit # / Visits authorized: 2/ medically necessary         Time In: 905  Time Out: 955  Total Appointment Time (timed & untimed codes): 50 minutes    Subjective     Patient reports: she is slightly swollen at her left UE today.  She sees her cardiologist tomorrow.    CMS Impairment/Limitation/Restriction for FOTO Survey  Therapist reviewed FOTO scores for Luis Menjivar on 7/10/2024. FOTO documents entered into [employer] - see Media section.  Patient's Physical FS Primary Measure: 59  Risk Adjusted Statistical FOTO: 59  Limitation Score: 41%  Category: Mobility    Objective       Eval 7/10/2024      Pain     0/10       Posture:      Good upright posture, L lateral shift at lumbar spine during walking      Dermatomes     No numbness or tingling, light touch in tact throughout      Myotomes     L UE and LE slight weakness 4/5 at all muscle groups     HIP FLX - Right 4+/5, Left 4/5     HIP ABD - Right 4+/5, Left 4/5     KNEE FLX - Right 4+/5, Left 4/5  KNEE EXT - Right 5/5, Left 4+/5  ANKLE DF - Right 5/5, Left 4+/5        Shoulder Flexion - Right 5/5, Left 4/5  Shoulder Extension - Right 5/5, Left 4+/5  Shoulder Scaption - Right 5/5, Left 4/5  Shoulder Abduction - Right 5/5, Left 4/5     Elbow flx - Right 5/5, Left 4/5  Elbow ext - Right 5/5, Left 4/5         Coordination     Alt supination/pronation = min to mod difficulty and lag     Alt toe taps = mod difficulty         Functional Testing     5x STS = 17 sec  2 MWT = 275'      Sit to stand = min difficulty  Bed mobility  Transitional movements = slow deliberate movements     Balance  testing     Rhomberg = 15 sec w mild sway  Tandem R for = 4 sec w mod sway  Tandem L for = unable      Gait Analysis:      Antalgic gait pattern w wide based gait left.     Luis received the following treatment:     Time Activities   Manual  min    TherAct 10 min NuStep    TherEx 10 min Generalized L UE and LE strengthening   Gait     Neuro Re-ed 30 min NM rehab   Modalities     E-Stim     Dry Needling     Canalith Repositioning           Home Exercises Provided and Patient Education Provided     Education provided:   -Plan of care, HEP, safety at home and with mobility    Assessment     Exercise focus on NM rehab and normalizing movement patterns at left UE and LE with good response.  Mod difficulty with complex movement warrior pose and foam balance, but able to complete.  VC for correct movement patterns and good corrections and adjustments. Moderate difficulty with left UE coordination during functional TB pulls requiring MC to complete. Advanced proprioceptive standing activity and UE strengthening.    Encouraged patient to discuss swelling with her MD and will monitor going forward.  VSS throughout session.    Will progress function as able.    Patient prognosis is Good.      Anticipated barriers to physical therapy: advanced age    Goals: Luis Is progressing well towards her goals.  Short Term Goals: 4 weeks   Patient will report at least 10% increase on initial FOTO score to indicate clinically significant functional improvement  Patient will demonstrate equal NM response     Long Term Goals: 12 weeks   Patient will report at least 20% increase on initial FOTO score to indicate clinically significant functional improvement  Patient will demonstrate equal NM response w alt toe taps for safe functional movement patterns  Patient will demonstrate good balance with testing for safe functional mobility  Plan     2-3x/week x 12 weeks     Emilie Laboy, PT

## 2024-07-16 NOTE — PROGRESS NOTES
Physical Therapy Treatment Note     Name: Luis Menjivar  Clinic Number: [id number]    Therapy Diagnosis:   No diagnosis found.    Physician: Warren Douglas MD    Visit Date: 7/17/2024    Physician Orders: PT Eval and Treat  Medical Diagnosis from Referral: CVA  Evaluation Date: 7/10/2024  Authorization Period Expiration: medically necessary  Plan of Care Expiration: 10/10/2024  Visit # / Visits authorized: 3/ medically necessary         Time In: 1053  Time Out: 1132  Total Appointment Time (timed & untimed codes): 39 minutes    Subjective     Patient reports: she is feeling better, but still not driving.  She sees her MD tomorrow and will determine if she can drive again.    CMS Impairment/Limitation/Restriction for FOTO Survey  Therapist reviewed FOTO scores for Luis Menjivar on 7/10/2024. FOTO documents entered into [employer] - see Media section.  Patient's Physical FS Primary Measure: 59  Risk Adjusted Statistical FOTO: 59  Limitation Score: 41%  Category: Mobility    Objective       Eval 7/10/2024      Pain     0/10       Posture:      Good upright posture, L lateral shift at lumbar spine during walking      Dermatomes     No numbness or tingling, light touch in tact throughout      Myotomes     L UE and LE slight weakness 4/5 at all muscle groups     HIP FLX - Right 4+/5, Left 4/5     HIP ABD - Right 4+/5, Left 4/5     KNEE FLX - Right 4+/5, Left 4/5  KNEE EXT - Right 5/5, Left 4+/5  ANKLE DF - Right 5/5, Left 4+/5        Shoulder Flexion - Right 5/5, Left 4/5  Shoulder Extension - Right 5/5, Left 4+/5  Shoulder Scaption - Right 5/5, Left 4/5  Shoulder Abduction - Right 5/5, Left 4/5     Elbow flx - Right 5/5, Left 4/5  Elbow ext - Right 5/5, Left 4/5         Coordination     Alt supination/pronation = min to mod difficulty and lag     Alt toe taps = mod difficulty         Functional Testing     5x STS = 17 sec  2 MWT = 275'      Sit to stand = min difficulty  Bed mobility  Transitional movements =  slow deliberate movements     Balance testing     Rhomberg = 15 sec w mild sway  Tandem R for = 4 sec w mod sway  Tandem L for = unable      Gait Analysis:      Antalgic gait pattern w wide based gait left.     Luis received the following treatment:     Time Activities   Manual  min    TherAct 10 min NuStep    TherEx 10 min Generalized L UE and LE strengthening   Gait     Neuro Re-ed 10 min NM rehab   Modalities     E-Stim     Dry Needling     Canalith Repositioning           Home Exercises Provided and Patient Education Provided     Education provided:   -Plan of care, HEP, safety at home and with mobility    Assessment     Exercise focus on NM rehab and normalizing movement patterns at left UE and LE with good response.  Mod difficulty with complex movement warrior pose and foam balance, but able to complete.  VC for correct movement patterns and good corrections and adjustments. Moderate difficulty with left UE coordination during functional TB pulls requiring MC to complete. Advanced proprioceptive standing activity and UE strengthening.    Encouraged patient to discuss swelling with her MD and will monitor going forward.  VSS throughout session.    Will progress function as able.    Patient prognosis is Good.      Anticipated barriers to physical therapy: advanced age    Goals: Luis Is progressing well towards her goals.  Short Term Goals: 4 weeks   Patient will report at least 10% increase on initial FOTO score to indicate clinically significant functional improvement  Patient will demonstrate equal NM response     Long Term Goals: 12 weeks   Patient will report at least 20% increase on initial FOTO score to indicate clinically significant functional improvement  Patient will demonstrate equal NM response w alt toe taps for safe functional movement patterns  Patient will demonstrate good balance with testing for safe functional mobility  Plan     2-3x/week x 12 weeks     Emilie Laboy, PT

## 2024-07-17 ENCOUNTER — CLINICAL SUPPORT (OUTPATIENT)
Dept: REHABILITATION | Facility: HOSPITAL | Age: 84
End: 2024-07-17
Payer: MEDICARE

## 2024-07-17 DIAGNOSIS — G81.94 LEFT HEMIPARESIS: Primary | ICD-10-CM

## 2024-07-17 PROCEDURE — 97110 THERAPEUTIC EXERCISES: CPT

## 2024-07-17 PROCEDURE — 97530 THERAPEUTIC ACTIVITIES: CPT

## 2024-07-17 PROCEDURE — 97112 NEUROMUSCULAR REEDUCATION: CPT

## 2024-07-18 NOTE — PROGRESS NOTES
Physical Therapy Treatment Note     Name: Luis Menjivar  Clinic Number: [id number]    Therapy Diagnosis:   No diagnosis found.    Physician: Warren Douglas MD    Visit Date: 7/22/2024    Physician Orders: PT Eval and Treat  Medical Diagnosis from Referral: CVA  Evaluation Date: 7/10/2024  Authorization Period Expiration: medically necessary  Plan of Care Expiration: 10/10/2024  Visit # / Visits authorized: 4/ medically necessary         Time In: 900  Time Out: 940  Total Appointment Time (timed & untimed codes): 40 minutes    Subjective     Patient reports: she is feeling better, but still not driving.      CMS Impairment/Limitation/Restriction for FOTO Survey  Therapist reviewed FOTO scores for Luis Menjivar on 7/10/2024. FOTO documents entered into [employer] - see Media section.  Patient's Physical FS Primary Measure: 59  Risk Adjusted Statistical FOTO: 59  Limitation Score: 41%  Category: Mobility    Objective       Eval 7/10/2024      Pain     0/10       Posture:      Good upright posture, L lateral shift at lumbar spine during walking      Dermatomes     No numbness or tingling, light touch in tact throughout      Myotomes     L UE and LE slight weakness 4/5 at all muscle groups     HIP FLX - Right 4+/5, Left 4/5     HIP ABD - Right 4+/5, Left 4/5     KNEE FLX - Right 4+/5, Left 4/5  KNEE EXT - Right 5/5, Left 4+/5  ANKLE DF - Right 5/5, Left 4+/5        Shoulder Flexion - Right 5/5, Left 4/5  Shoulder Extension - Right 5/5, Left 4+/5  Shoulder Scaption - Right 5/5, Left 4/5  Shoulder Abduction - Right 5/5, Left 4/5     Elbow flx - Right 5/5, Left 4/5  Elbow ext - Right 5/5, Left 4/5         Coordination     Alt supination/pronation = min to mod difficulty and lag     Alt toe taps = mod difficulty         Functional Testing     5x STS = 17 sec  2 MWT = 275'      Sit to stand = min difficulty  Bed mobility  Transitional movements = slow deliberate movements     Balance testing     Rhomberg = 15 sec w  mild sway  Tandem R for = 4 sec w mod sway  Tandem L for = unable      Gait Analysis:      Antalgic gait pattern w wide based gait left.     Luis received the following treatment:     Time Activities   Manual  min    TherAct 20 min NuStep , standing functional activity   TherEx 10 min Generalized L UE and LE strengthening   Gait     Neuro Re-ed 10 min NM rehab   Modalities     E-Stim     Dry Needling     Canalith Repositioning           Home Exercises Provided and Patient Education Provided     Education provided:   -Plan of care, HEP, safety at home and with mobility    Assessment     Exercise focus on NM rehab and normalizing movement patterns at left UE and LE with good response.  Mod difficulty with complex movement patterns and foam balance, but able to complete.  VC for correct movement patterns and good corrections and adjustments. Advanced resistance and functional activity with increased ease. Advanced proprioceptive standing activity and UE strengthening.    Encouraged patient to discuss swelling with her MD and will monitor going forward.  VSS throughout session.    Will progress function as able.    Patient prognosis is Good.      Anticipated barriers to physical therapy: advanced age    Goals: Luis Is progressing well towards her goals.  Short Term Goals: 4 weeks   Patient will report at least 10% increase on initial FOTO score to indicate clinically significant functional improvement  Patient will demonstrate equal NM response     Long Term Goals: 12 weeks   Patient will report at least 20% increase on initial FOTO score to indicate clinically significant functional improvement  Patient will demonstrate equal NM response w alt toe taps for safe functional movement patterns  Patient will demonstrate good balance with testing for safe functional mobility  Plan     2-3x/week x 12 weeks     Emilie Laboy, PT

## 2024-07-22 ENCOUNTER — CLINICAL SUPPORT (OUTPATIENT)
Dept: REHABILITATION | Facility: HOSPITAL | Age: 84
End: 2024-07-22
Payer: MEDICARE

## 2024-07-22 DIAGNOSIS — G81.94 LEFT HEMIPARESIS: Primary | ICD-10-CM

## 2024-07-22 PROCEDURE — 97110 THERAPEUTIC EXERCISES: CPT

## 2024-07-22 PROCEDURE — 97530 THERAPEUTIC ACTIVITIES: CPT

## 2024-07-22 PROCEDURE — 97014 ELECTRIC STIMULATION THERAPY: CPT

## 2024-07-23 ENCOUNTER — LAB VISIT (OUTPATIENT)
Dept: LAB | Facility: HOSPITAL | Age: 84
End: 2024-07-23
Attending: INTERNAL MEDICINE
Payer: MEDICARE

## 2024-07-23 DIAGNOSIS — E78.2 MIXED HYPERLIPIDEMIA: Primary | ICD-10-CM

## 2024-07-23 DIAGNOSIS — I10 HYPERTENSION, UNSPECIFIED TYPE: ICD-10-CM

## 2024-07-23 DIAGNOSIS — K21.9 GASTROESOPHAGEAL REFLUX DISEASE, UNSPECIFIED WHETHER ESOPHAGITIS PRESENT: ICD-10-CM

## 2024-07-23 DIAGNOSIS — R73.01 IMPAIRED FASTING GLUCOSE: ICD-10-CM

## 2024-07-23 DIAGNOSIS — E04.1 THYROID CYST: ICD-10-CM

## 2024-07-23 LAB
ALBUMIN SERPL-MCNC: 3.7 G/DL (ref 3.4–4.8)
ALBUMIN/GLOB SERPL: 1.3 RATIO (ref 1.1–2)
ALP SERPL-CCNC: 86 UNIT/L (ref 40–150)
ALT SERPL-CCNC: 21 UNIT/L (ref 0–55)
ANION GAP SERPL CALC-SCNC: 8 MEQ/L
AST SERPL-CCNC: 20 UNIT/L (ref 5–34)
BASOPHILS # BLD AUTO: 0.09 X10(3)/MCL
BASOPHILS NFR BLD AUTO: 1.2 %
BILIRUB SERPL-MCNC: 0.8 MG/DL
BUN SERPL-MCNC: 24 MG/DL (ref 9.8–20.1)
CALCIUM SERPL-MCNC: 10.3 MG/DL (ref 8.4–10.2)
CHLORIDE SERPL-SCNC: 105 MMOL/L (ref 98–107)
CHOLEST SERPL-MCNC: 126 MG/DL
CHOLEST/HDLC SERPL: 3 {RATIO} (ref 0–5)
CO2 SERPL-SCNC: 30 MMOL/L (ref 23–31)
CREAT SERPL-MCNC: 1.07 MG/DL (ref 0.55–1.02)
CREAT/UREA NIT SERPL: 22
EOSINOPHIL # BLD AUTO: 0.2 X10(3)/MCL (ref 0–0.9)
EOSINOPHIL NFR BLD AUTO: 2.6 %
ERYTHROCYTE [DISTWIDTH] IN BLOOD BY AUTOMATED COUNT: 13.3 % (ref 11.5–17)
EST. AVERAGE GLUCOSE BLD GHB EST-MCNC: 131.2 MG/DL
GFR SERPLBLD CREATININE-BSD FMLA CKD-EPI: 51 ML/MIN/1.73/M2
GLOBULIN SER-MCNC: 2.9 GM/DL (ref 2.4–3.5)
GLUCOSE SERPL-MCNC: 115 MG/DL (ref 82–115)
HBA1C MFR BLD: 6.2 %
HCT VFR BLD AUTO: 42.6 % (ref 37–47)
HDLC SERPL-MCNC: 41 MG/DL (ref 35–60)
HGB BLD-MCNC: 13.7 G/DL (ref 12–16)
IMM GRANULOCYTES # BLD AUTO: 0.03 X10(3)/MCL (ref 0–0.04)
IMM GRANULOCYTES NFR BLD AUTO: 0.4 %
LDLC SERPL CALC-MCNC: 51 MG/DL (ref 50–140)
LYMPHOCYTES # BLD AUTO: 1.45 X10(3)/MCL (ref 0.6–4.6)
LYMPHOCYTES NFR BLD AUTO: 19.1 %
MCH RBC QN AUTO: 30 PG (ref 27–31)
MCHC RBC AUTO-ENTMCNC: 32.2 G/DL (ref 33–36)
MCV RBC AUTO: 93.2 FL (ref 80–94)
MONOCYTES # BLD AUTO: 0.6 X10(3)/MCL (ref 0.1–1.3)
MONOCYTES NFR BLD AUTO: 7.9 %
NEUTROPHILS # BLD AUTO: 5.23 X10(3)/MCL (ref 2.1–9.2)
NEUTROPHILS NFR BLD AUTO: 68.8 %
NRBC BLD AUTO-RTO: 0 %
PLATELET # BLD AUTO: 222 X10(3)/MCL (ref 130–400)
PMV BLD AUTO: 11.3 FL (ref 7.4–10.4)
POTASSIUM SERPL-SCNC: 3.7 MMOL/L (ref 3.5–5.1)
PROT SERPL-MCNC: 6.6 GM/DL (ref 5.8–7.6)
RBC # BLD AUTO: 4.57 X10(6)/MCL (ref 4.2–5.4)
SODIUM SERPL-SCNC: 143 MMOL/L (ref 136–145)
T4 FREE SERPL-MCNC: 1.08 NG/DL (ref 0.7–1.48)
TRIGL SERPL-MCNC: 172 MG/DL (ref 37–140)
TSH SERPL-ACNC: 1.07 UIU/ML (ref 0.35–4.94)
VLDLC SERPL CALC-MCNC: 34 MG/DL
WBC # BLD AUTO: 7.6 X10(3)/MCL (ref 4.5–11.5)

## 2024-07-23 PROCEDURE — 83036 HEMOGLOBIN GLYCOSYLATED A1C: CPT

## 2024-07-23 PROCEDURE — 84439 ASSAY OF FREE THYROXINE: CPT

## 2024-07-23 PROCEDURE — 85025 COMPLETE CBC W/AUTO DIFF WBC: CPT

## 2024-07-23 PROCEDURE — 84443 ASSAY THYROID STIM HORMONE: CPT

## 2024-07-23 PROCEDURE — 80053 COMPREHEN METABOLIC PANEL: CPT

## 2024-07-23 PROCEDURE — 36415 COLL VENOUS BLD VENIPUNCTURE: CPT

## 2024-07-23 PROCEDURE — 80061 LIPID PANEL: CPT

## 2024-07-24 ENCOUNTER — CLINICAL SUPPORT (OUTPATIENT)
Dept: REHABILITATION | Facility: HOSPITAL | Age: 84
End: 2024-07-24
Payer: MEDICARE

## 2024-07-24 DIAGNOSIS — G81.94 LEFT HEMIPARESIS: Primary | ICD-10-CM

## 2024-07-24 PROCEDURE — 97110 THERAPEUTIC EXERCISES: CPT

## 2024-07-24 PROCEDURE — 97112 NEUROMUSCULAR REEDUCATION: CPT

## 2024-07-24 PROCEDURE — 97530 THERAPEUTIC ACTIVITIES: CPT

## 2024-07-24 NOTE — PROGRESS NOTES
Physical Therapy Treatment Note     Name: Luis Menjivar  Clinic Number: [id number]    Therapy Diagnosis:   Encounter Diagnosis   Name Primary?    Left hemiparesis Yes       Physician: Warren Douglas MD    Visit Date: 7/24/2024    Physician Orders: PT Eval and Treat  Medical Diagnosis from Referral: CVA  Evaluation Date: 7/10/2024  Authorization Period Expiration: medically necessary  Plan of Care Expiration: 10/10/2024  Visit # / Visits authorized: 5/ medically necessary         Time In: 850  Time Out: 930  Total Appointment Time (timed & untimed codes): 40 minutes    Subjective     Patient reports: she is feeling better, but still not driving.  She is wanting to begin driving again.    CMS Impairment/Limitation/Restriction for FOTO Survey  Therapist reviewed FOTO scores for Luis Menjivar on 7/10/2024. FOTO documents entered into [employer] - see Media section.  Patient's Physical FS Primary Measure: 59  Risk Adjusted Statistical FOTO: 59  Limitation Score: 41%  Category: Mobility    Objective       Eval 7/10/2024      Pain     0/10       Posture:      Good upright posture, L lateral shift at lumbar spine during walking      Dermatomes     No numbness or tingling, light touch in tact throughout      Myotomes     L UE and LE slight weakness 4/5 at all muscle groups     HIP FLX - Right 4+/5, Left 4/5     HIP ABD - Right 4+/5, Left 4/5     KNEE FLX - Right 4+/5, Left 4/5  KNEE EXT - Right 5/5, Left 4+/5  ANKLE DF - Right 5/5, Left 4+/5        Shoulder Flexion - Right 5/5, Left 4/5  Shoulder Extension - Right 5/5, Left 4+/5  Shoulder Scaption - Right 5/5, Left 4/5  Shoulder Abduction - Right 5/5, Left 4/5     Elbow flx - Right 5/5, Left 4/5  Elbow ext - Right 5/5, Left 4/5         Coordination     Alt supination/pronation = min to mod difficulty and lag     Alt toe taps = mod difficulty         Functional Testing     5x STS = 17 sec  2 MWT = 275'      Sit to stand = min difficulty  Bed mobility  Transitional  movements = slow deliberate movements     Balance testing     Rhomberg = 15 sec w mild sway  Tandem R for = 4 sec w mod sway  Tandem L for = unable      Gait Analysis:      Antalgic gait pattern w wide based gait left.     Luis received the following treatment:     Time Activities   Manual  min    TherAct 20 min NuStep , standing functional activity   TherEx 10 min Generalized L UE and LE strengthening   Gait     Neuro Re-ed 10 min NM rehab   Modalities     E-Stim     Dry Needling     Canalith Repositioning           Home Exercises Provided and Patient Education Provided     Education provided:   -Plan of care, HEP, safety at home and with mobility    Assessment     Exercise focus on NM rehab and normalizing movement patterns at left UE and LE with good response.  Mod difficulty with complex movement patterns and foam balance, but able to complete.  VC for correct movement patterns and good corrections and adjustments. Advanced resistance and functional activity with increased ease. Advanced proprioceptive standing activity and UE strengthening again today.    Encouraged patient to discuss swelling with her MD and will monitor going forward.  VSS throughout session.     Discussed ways to return to driving safely, as patient repeatedly voices concerns about driving. Discussed [employer] and encouraged to discuss with MD at next appointment.    Will progress function as able.    Patient prognosis is Good.      Anticipated barriers to physical therapy: advanced age    Goals: Luis Is progressing well towards her goals.  Short Term Goals: 4 weeks   Patient will report at least 10% increase on initial FOTO score to indicate clinically significant functional improvement  Patient will demonstrate equal NM response     Long Term Goals: 12 weeks   Patient will report at least 20% increase on initial FOTO score to indicate clinically significant functional improvement  Patient will demonstrate equal NM  response w alt toe taps for safe functional movement patterns  Patient will demonstrate good balance with testing for safe functional mobility  Plan     2-3x/week x 12 weeks     Emilie Laboy, PT

## 2024-07-29 ENCOUNTER — CLINICAL SUPPORT (OUTPATIENT)
Dept: REHABILITATION | Facility: HOSPITAL | Age: 84
End: 2024-07-29
Payer: MEDICARE

## 2024-07-29 DIAGNOSIS — G81.94 LEFT HEMIPARESIS: Primary | ICD-10-CM

## 2024-07-29 PROCEDURE — 97530 THERAPEUTIC ACTIVITIES: CPT

## 2024-07-29 PROCEDURE — 97110 THERAPEUTIC EXERCISES: CPT

## 2024-07-29 PROCEDURE — 97112 NEUROMUSCULAR REEDUCATION: CPT

## 2024-07-29 NOTE — PROGRESS NOTES
Physical Therapy Treatment Note     Name: Luis Menjivar  Clinic Number: [id number]    Therapy Diagnosis:   Encounter Diagnosis   Name Primary?    Left hemiparesis Yes       Physician: Warren Douglas MD    Visit Date: 7/29/2024    Physician Orders: PT Eval and Treat  Medical Diagnosis from Referral: CVA  Evaluation Date: 7/10/2024  Authorization Period Expiration: medically necessary  Plan of Care Expiration: 10/10/2024  Visit # / Visits authorized: 6/ medically necessary         Time In: 853  Time Out: 932  Total Appointment Time (timed & untimed codes): 39 minutes    Subjective     Patient reports: no new complaints.  She says she was able to work in her flower beds this weekend cutting back her roses.    CMS Impairment/Limitation/Restriction for FOTO Survey  Therapist reviewed FOTO scores for Luis Menjivar on 7/10/2024. FOTO documents entered into [employer] - see Media section.  Patient's Physical FS Primary Measure: 59 (68 on 7/29)  Risk Adjusted Statistical FOTO: 59  Limitation Score: 41% (32% on 7/29)  Category: Mobility    Objective       Eval 7/10/2024      Pain     0/10       Posture:      Good upright posture, L lateral shift at lumbar spine during walking      Dermatomes     No numbness or tingling, light touch in tact throughout      Myotomes     L UE and LE slight weakness 4/5 at all muscle groups     HIP FLX - Right 4+/5, Left 4/5     HIP ABD - Right 4+/5, Left 4/5     KNEE FLX - Right 4+/5, Left 4/5  KNEE EXT - Right 5/5, Left 4+/5  ANKLE DF - Right 5/5, Left 4+/5        Shoulder Flexion - Right 5/5, Left 4/5  Shoulder Extension - Right 5/5, Left 4+/5  Shoulder Scaption - Right 5/5, Left 4/5  Shoulder Abduction - Right 5/5, Left 4/5     Elbow flx - Right 5/5, Left 4/5  Elbow ext - Right 5/5, Left 4/5         Coordination     Alt supination/pronation = min to mod difficulty and lag     Alt toe taps = mod difficulty         Functional Testing     5x STS = 17 sec  2 MWT = 275'      Sit to stand =  min difficulty  Bed mobility  Transitional movements = slow deliberate movements     Balance testing     Rhomberg = 15 sec w mild sway  Tandem R for = 4 sec w mod sway  Tandem L for = unable      Gait Analysis:      Antalgic gait pattern w wide based gait left.     Luis received the following treatment:     Time Activities   Manual  min    TherAct 19 min NuStep , standing functional activity   TherEx 10 min Generalized L UE and LE strengthening   Gait     Neuro Re-ed 10 min NM rehab   Modalities     E-Stim     Dry Needling     Canalith Repositioning           Home Exercises Provided and Patient Education Provided     Education provided:   -Plan of care, HEP, safety at home and with mobility    Assessment     Exercise focus on NM rehab and normalizing movement patterns at left UE and LE with good response.  Min difficulty with complex movement patterns and foam balance, but able to complete.  VC for correct movement patterns and good corrections and adjustments. Advanced resistance and functional activity with increased ease. Advanced proprioceptive standing activity and UE strengthening again today.    Will progress function as able.    Patient prognosis is Good.      Anticipated barriers to physical therapy: advanced age    Goals: Luis Is progressing well towards her goals.  Short Term Goals: 4 weeks   Patient will report at least 10% increase on initial FOTO score to indicate clinically significant functional improvement  Patient will demonstrate equal NM response     Long Term Goals: 12 weeks   Patient will report at least 20% increase on initial FOTO score to indicate clinically significant functional improvement  Patient will demonstrate equal NM response w alt toe taps for safe functional movement patterns  Patient will demonstrate good balance with testing for safe functional mobility  Plan     2-3x/week x 12 weeks     Emilie Laboy, PT

## 2024-07-31 ENCOUNTER — CLINICAL SUPPORT (OUTPATIENT)
Dept: REHABILITATION | Facility: HOSPITAL | Age: 84
End: 2024-07-31
Payer: MEDICARE

## 2024-07-31 DIAGNOSIS — G81.94 LEFT HEMIPARESIS: Primary | ICD-10-CM

## 2024-07-31 PROCEDURE — 97112 NEUROMUSCULAR REEDUCATION: CPT

## 2024-07-31 PROCEDURE — 97530 THERAPEUTIC ACTIVITIES: CPT

## 2024-07-31 PROCEDURE — 97110 THERAPEUTIC EXERCISES: CPT

## 2024-07-31 NOTE — PROGRESS NOTES
Physical Therapy Treatment Note     Name: Luis Menjivar  Clinic Number: [id number]    Therapy Diagnosis:   Encounter Diagnosis   Name Primary?    Left hemiparesis Yes       Physician: Warren Douglas MD    Visit Date: 7/31/2024    Physician Orders: PT Eval and Treat  Medical Diagnosis from Referral: CVA  Evaluation Date: 7/10/2024  Authorization Period Expiration: medically necessary  Plan of Care Expiration: 10/10/2024  Visit # / Visits authorized: 7/ medically necessary         Time In: 854  Time Out: 934  Total Appointment Time (timed & untimed codes): 40 minutes    Subjective     Patient reports: no new complaints.  She says she was able to can figs yesterday with no trouble.  She had a cardiologist appointment that went well with no new problems. She has a PCP appointment tomorrow to learn if she can return to driving. She says she is not having any residual problems at home at this time.    CMS Impairment/Limitation/Restriction for FOTO Survey  Therapist reviewed FOTO scores for Luis Menjivar on 7/10/2024. FOTO documents entered into [employer] - see Media section.  Patient's Physical FS Primary Measure: 59 (68 on 7/29)  Risk Adjusted Statistical FOTO: 59  Limitation Score: 41% (32% on 7/29)  Category: Mobility    Objective       Eval 7/10/2024      Pain     0/10       Posture:      Good upright posture, L lateral shift at lumbar spine during walking      Dermatomes     No numbness or tingling, light touch in tact throughout      Myotomes     L UE and LE slight weakness 4/5 at all muscle groups     HIP FLX - Right 4+/5, Left 4/5     HIP ABD - Right 4+/5, Left 4/5     KNEE FLX - Right 4+/5, Left 4/5  KNEE EXT - Right 5/5, Left 4+/5  ANKLE DF - Right 5/5, Left 4+/5        Shoulder Flexion - Right 5/5, Left 4/5  Shoulder Extension - Right 5/5, Left 4+/5  Shoulder Scaption - Right 5/5, Left 4/5  Shoulder Abduction - Right 5/5, Left 4/5     Elbow flx - Right 5/5, Left 4/5  Elbow ext - Right 5/5, Left 4/5          Coordination     Alt supination/pronation = min to mod difficulty and lag     Alt toe taps = mod difficulty         Functional Testing     5x STS = 17 sec  2 MWT = 275'      Sit to stand = min difficulty  Bed mobility  Transitional movements = slow deliberate movements     Balance testing     Rhomberg = 15 sec w mild sway  Tandem R for = 4 sec w mod sway  Tandem L for = unable      Gait Analysis:      Antalgic gait pattern w wide based gait left.     Luis received the following treatment:     Time Activities   Manual  min    TherAct 20 min NuStep , standing functional activity   TherEx 10 min Generalized L UE and LE strengthening   Gait     Neuro Re-ed 10 min NM rehab   Modalities     E-Stim     Dry Needling     Canalith Repositioning           Home Exercises Provided and Patient Education Provided     Education provided:   -Plan of care, HEP, safety at home and with mobility    Assessment     Exercise focus on NM rehab and normalizing movement patterns at left UE and LE with good response.  Min to no difficulty with complex movement patterns and foam balance, but able to complete.  VC for correct movement patterns and good corrections and adjustments. Advanced resistance and functional activity with increased ease. Advanced proprioceptive standing activity and UE strengthening again today.    Patient demonstrating improved ease of movement and coordination at each session.  Initiated DC discussion for end of next week.  Patient in agreement.    Will progress function as able.    Patient prognosis is Good.      Anticipated barriers to physical therapy: advanced age    Goals: Luis Is progressing well towards her goals.  Short Term Goals: 4 weeks   Patient will report at least 10% increase on initial FOTO score to indicate clinically significant functional improvement  Patient will demonstrate equal NM response     Long Term Goals: 12 weeks   Patient will report at least 20% increase on initial FOTO  score to indicate clinically significant functional improvement  Patient will demonstrate equal NM response w alt toe taps for safe functional movement patterns  Patient will demonstrate good balance with testing for safe functional mobility  Plan     2-3x/week x 12 weeks     Emilie Laboy, PT

## 2024-08-05 ENCOUNTER — CLINICAL SUPPORT (OUTPATIENT)
Dept: REHABILITATION | Facility: HOSPITAL | Age: 84
End: 2024-08-05
Payer: MEDICARE

## 2024-08-05 DIAGNOSIS — G81.94 LEFT HEMIPARESIS: Primary | ICD-10-CM

## 2024-08-05 PROCEDURE — 97530 THERAPEUTIC ACTIVITIES: CPT

## 2024-08-05 PROCEDURE — 97110 THERAPEUTIC EXERCISES: CPT

## 2024-08-07 ENCOUNTER — CLINICAL SUPPORT (OUTPATIENT)
Dept: REHABILITATION | Facility: HOSPITAL | Age: 84
End: 2024-08-07
Payer: MEDICARE

## 2024-08-07 DIAGNOSIS — G81.94 LEFT HEMIPARESIS: Primary | ICD-10-CM

## 2024-08-07 PROCEDURE — 97112 NEUROMUSCULAR REEDUCATION: CPT | Mod: KX

## 2024-08-07 PROCEDURE — 97530 THERAPEUTIC ACTIVITIES: CPT | Mod: KX

## 2024-08-07 PROCEDURE — 97110 THERAPEUTIC EXERCISES: CPT | Mod: KX

## 2024-10-21 ENCOUNTER — OFFICE VISIT (OUTPATIENT)
Dept: ORTHOPEDICS | Facility: CLINIC | Age: 84
End: 2024-10-21
Payer: MEDICARE

## 2024-10-21 VITALS
HEART RATE: 76 BPM | SYSTOLIC BLOOD PRESSURE: 126 MMHG | WEIGHT: 198 LBS | BODY MASS INDEX: 30.01 KG/M2 | DIASTOLIC BLOOD PRESSURE: 55 MMHG | HEIGHT: 68 IN

## 2024-10-21 DIAGNOSIS — M17.11 PRIMARY OSTEOARTHRITIS OF RIGHT KNEE: Primary | ICD-10-CM

## 2024-10-21 PROCEDURE — 20610 DRAIN/INJ JOINT/BURSA W/O US: CPT | Mod: RT,,, | Performed by: SPECIALIST

## 2024-10-21 PROCEDURE — 99213 OFFICE O/P EST LOW 20 MIN: CPT | Mod: 25,,, | Performed by: SPECIALIST

## 2024-10-21 RX ORDER — ATORVASTATIN CALCIUM 40 MG/1
40 TABLET, FILM COATED ORAL
COMMUNITY
Start: 2024-08-20 | End: 2025-02-16

## 2024-10-21 RX ORDER — CLOPIDOGREL BISULFATE 75 MG/1
75 TABLET ORAL EVERY MORNING
COMMUNITY

## 2024-10-21 RX ORDER — BETAMETHASONE SODIUM PHOSPHATE AND BETAMETHASONE ACETATE 3; 3 MG/ML; MG/ML
12 INJECTION, SUSPENSION INTRA-ARTICULAR; INTRALESIONAL; INTRAMUSCULAR; SOFT TISSUE
Status: DISCONTINUED | OUTPATIENT
Start: 2024-10-21 | End: 2024-10-21 | Stop reason: HOSPADM

## 2024-10-21 RX ADMIN — BETAMETHASONE SODIUM PHOSPHATE AND BETAMETHASONE ACETATE 12 MG: 3; 3 INJECTION, SUSPENSION INTRA-ARTICULAR; INTRALESIONAL; INTRAMUSCULAR; SOFT TISSUE at 03:10

## 2024-10-21 NOTE — PROGRESS NOTES
Past Medical History:   Diagnosis Date    Chronic lower back pain     CKD (chronic kidney disease) stage 3, GFR 30-59 ml/min     Coronary arteriosclerosis     Family history of breast cancer in mother     GERD (gastroesophageal reflux disease)     History of total knee arthroplasty     Hyperlipidemia     Hypertension     Impaired gait and mobility     Neuropathy, peripheral     Osteoarthritis of left knee     Osteoarthritis of right hip     Osteopenia     Overactive bladder     Personal history of breast cancer     Postmenopausal     Prediabetes     Stroke     Trochanteric bursitis, right hip        Past Surgical History:   Procedure Laterality Date    APPENDECTOMY      BLADDER SUSPENSION       SECTION       SECTION      COLONOSCOPY  2017    Dr Wilber Short    HYSTERECTOMY  1968    OPEN REDUCTION AND INTERNAL FIXATION (ORIF) OF FRACTURE OF RADIUS  2006    TONSILLECTOMY  1943    TOTAL KNEE ARTHROPLASTY  2021    Dr Angelito Bowman       Current Outpatient Medications   Medication Sig    amLODIPine (NORVASC) 5 MG tablet TAKE 1 TABLET DAILY    aspirin (ECOTRIN) 81 MG EC tablet Take 81 mg by mouth.    atorvastatin (LIPITOR) 40 MG tablet Take 40 mg by mouth.    lovastatin (MEVACOR) 40 MG tablet TAKE 1 TABLET NIGHTLY    pantoprazole (PROTONIX) 40 MG tablet     tamsulosin (FLOMAX) 0.4 mg Cap Take 1 capsule (0.4 mg total) by mouth once daily.    triamterene-hydrochlorothiazide 37.5-25 mg (MAXZIDE-25) 37.5-25 mg per tablet TAKE 1 TABLET DAILY    clopidogreL (PLAVIX) 75 mg tablet Take 75 mg by mouth every morning.    gabapentin (NEURONTIN) 300 MG capsule Take 300 mg by mouth 2 (two) times daily. (Patient not taking: Reported on 10/21/2024)     No current facility-administered medications for this visit.       Review of patient's allergies indicates:  No Known Allergies    Family History   Problem Relation Name Age of Onset    Heart attack Mother      Breast cancer Mother       "Hypertension Mother      Lung cancer Father          cause of death    Hypertension Sister      Hypertension Sister      Prostate cancer Brother         Social History     Socioeconomic History    Marital status:    Tobacco Use    Smoking status: Never    Smokeless tobacco: Never   Substance and Sexual Activity    Alcohol use: Never    Drug use: Never    Sexual activity: Not Currently     Partners: Male       Chief Complaint:   Chief Complaint   Patient presents with    Right Knee - Pain     F/u from right synvisc inj. With moderate relief.  Ambulates without assistive devices. Not currently in physical therapy.        Consulting Physician: No ref. provider found    History of present illness:    This is a 84 y.o. year old female who complains of pain in the right knee.  She would like a corticosteroid shot today.  The pain is better since her last visit when she had Synvisc injection.    Review of Systems:    Constitution:   Denies chills, fever, and sweats.  HENT:   Denies headaches or blurry vision.  Cardiovascular:  Denies chest pain or irregular heart beat.  Respiratory:   Denies cough or shortness of breath.  Gastrointestinal:  Denies abdominal pain, nausea, or vomiting.  Musculoskeletal:   Denies muscle cramps.  Neurological:   Denies dizziness or focal weakness.  Psychiatric/Behavior: Normal mental status.  Hematology/Lymph:  Denies bleeding problem or easy bruising/bleeding.  Skin:    Denies rash or suspicious lesions.    Examination:    Vital Signs:    Vitals:    10/21/24 1526   BP: (!) 126/55   Pulse: 76   Weight: 89.8 kg (198 lb)   Height: 5' 8" (1.727 m)       Body mass index is 30.11 kg/m².    Constitution:   Well-developed, well nourished patient in no acute distress.  Neurological:   Alert and oriented x 3 and cooperative to examination.     Psychiatric/Behavior: Normal mental status.  Respiratory:   No shortness of breath.non labored breathing.  Cardiovascular: Regular rate and rhythm  Eyes: "    Extraoccular muscles intact  Skin:    No scars, rash or suspicious lesions.    Physical Exam:     General Musculoskeletal Exam   Gait: antalgic       Right Knee Exam     Inspection   Erythema: absent  Effusion: present  Deformity: present  Bruising: absent    Tenderness   The patient is tender to palpation of the medial joint line, MCL, condyle and medial retinaculum.    Crepitus   The patient has crepitus of the medial joint line.    Range of Motion   Extension: abnormal   Flexion: abnormal   0° to 125°    Tests   Meniscus   Latasha:  Medial - positive Lateral - negative  Ligament Examination   MCL - Valgus: normal (0 to 2mm)  LCL - Varus: normal  Patella   Passive Patellar Tilt: neutral    Other   Sensation: normal    Comments:  Varus deformity    Muscle Strength   Right Lower Extremity   Quadriceps:  5/5   Hamstrin/5     Vascular Exam     Right Pulses  Dorsalis Pedis:      2+  Posterior Tibial:      2+        Assessment: Primary osteoarthritis of right knee        Plan:  Activities as tolerated and follow up p.r.n.    After verbal consent and sterile prep the right knee was injected with 2 cc of corticosteroid and 2 cc of lidocaine.  Patient tolerated procedure well with no complications.      DISCLAIMER: This note may have been dictated using voice recognition software and may contain grammatical errors.     NOTE: Consult report sent to referring provider via [employer] EMR.

## 2024-10-21 NOTE — PROCEDURES
Large Joint Aspiration/Injection: R knee    Date/Time: 10/21/2024 3:15 PM    Performed by: Angelito Bowman MD  Authorized by: Angelito Bowman MD    Consent Done?:  Yes (Verbal)  Indications:  Arthritis  Timeout: prior to procedure the correct patient, procedure, and site was verified    Prep: patient was prepped and draped in usual sterile fashion      Local anesthesia used?: Yes    Local anesthetic:  Lidocaine 2% without epinephrine    Details:  Needle Size:  25 G  Ultrasonic Guidance for needle placement?: No    Location:  Knee  Site:  R knee  Medications:  12 mg betamethasone acetate-betamethasone sodium phosphate 6 mg/mL  Patient tolerance:  Patient tolerated the procedure well with no immediate complications

## 2024-11-11 ENCOUNTER — LAB VISIT (OUTPATIENT)
Dept: LAB | Facility: HOSPITAL | Age: 84
End: 2024-11-11
Attending: INTERNAL MEDICINE
Payer: MEDICARE

## 2024-11-11 DIAGNOSIS — Z79.899 NEED FOR PROPHYLACTIC CHEMOTHERAPY: Primary | ICD-10-CM

## 2024-11-11 LAB
ALBUMIN SERPL-MCNC: 3.8 G/DL (ref 3.4–4.8)
ALBUMIN/GLOB SERPL: 1.3 RATIO (ref 1.1–2)
ALP SERPL-CCNC: 88 UNIT/L (ref 40–150)
ALT SERPL-CCNC: 27 UNIT/L (ref 0–55)
ANION GAP SERPL CALC-SCNC: 8 MEQ/L
AST SERPL-CCNC: 35 UNIT/L (ref 5–34)
BASOPHILS # BLD AUTO: 0.07 X10(3)/MCL
BASOPHILS NFR BLD AUTO: 0.9 %
BILIRUB SERPL-MCNC: 0.7 MG/DL
BUN SERPL-MCNC: 23 MG/DL (ref 9.8–20.1)
CALCIUM SERPL-MCNC: 9.9 MG/DL (ref 8.4–10.2)
CHLORIDE SERPL-SCNC: 101 MMOL/L (ref 98–107)
CO2 SERPL-SCNC: 32 MMOL/L (ref 23–31)
CREAT SERPL-MCNC: 1.59 MG/DL (ref 0.55–1.02)
CREAT/UREA NIT SERPL: 14
EOSINOPHIL # BLD AUTO: 0.14 X10(3)/MCL (ref 0–0.9)
EOSINOPHIL NFR BLD AUTO: 1.8 %
ERYTHROCYTE [DISTWIDTH] IN BLOOD BY AUTOMATED COUNT: 14.6 % (ref 11.5–17)
GFR SERPLBLD CREATININE-BSD FMLA CKD-EPI: 32 ML/MIN/1.73/M2
GLOBULIN SER-MCNC: 2.9 GM/DL (ref 2.4–3.5)
GLUCOSE SERPL-MCNC: 202 MG/DL (ref 82–115)
HCT VFR BLD AUTO: 40.3 % (ref 37–47)
HGB BLD-MCNC: 12.8 G/DL (ref 12–16)
IMM GRANULOCYTES # BLD AUTO: 0.02 X10(3)/MCL (ref 0–0.04)
IMM GRANULOCYTES NFR BLD AUTO: 0.3 %
LYMPHOCYTES # BLD AUTO: 1.4 X10(3)/MCL (ref 0.6–4.6)
LYMPHOCYTES NFR BLD AUTO: 17.9 %
MCH RBC QN AUTO: 29.8 PG (ref 27–31)
MCHC RBC AUTO-ENTMCNC: 31.8 G/DL (ref 33–36)
MCV RBC AUTO: 93.9 FL (ref 80–94)
MONOCYTES # BLD AUTO: 0.5 X10(3)/MCL (ref 0.1–1.3)
MONOCYTES NFR BLD AUTO: 6.4 %
NEUTROPHILS # BLD AUTO: 5.67 X10(3)/MCL (ref 2.1–9.2)
NEUTROPHILS NFR BLD AUTO: 72.7 %
NRBC BLD AUTO-RTO: 0 %
PLATELET # BLD AUTO: 189 X10(3)/MCL (ref 130–400)
PMV BLD AUTO: 11.3 FL (ref 7.4–10.4)
POTASSIUM SERPL-SCNC: 4.7 MMOL/L (ref 3.5–5.1)
PROT SERPL-MCNC: 6.7 GM/DL (ref 5.8–7.6)
RBC # BLD AUTO: 4.29 X10(6)/MCL (ref 4.2–5.4)
SODIUM SERPL-SCNC: 141 MMOL/L (ref 136–145)
TSH SERPL-ACNC: 0.96 UIU/ML (ref 0.35–4.94)
WBC # BLD AUTO: 7.8 X10(3)/MCL (ref 4.5–11.5)

## 2024-11-11 PROCEDURE — 85025 COMPLETE CBC W/AUTO DIFF WBC: CPT

## 2024-11-11 PROCEDURE — 80053 COMPREHEN METABOLIC PANEL: CPT

## 2024-11-11 PROCEDURE — 84443 ASSAY THYROID STIM HORMONE: CPT

## 2024-11-11 PROCEDURE — 36415 COLL VENOUS BLD VENIPUNCTURE: CPT

## 2024-12-10 ENCOUNTER — LAB VISIT (OUTPATIENT)
Dept: LAB | Facility: HOSPITAL | Age: 84
End: 2024-12-10
Attending: INTERNAL MEDICINE
Payer: MEDICARE

## 2024-12-10 DIAGNOSIS — I10 HYPERTENSION, UNSPECIFIED TYPE: ICD-10-CM

## 2024-12-10 DIAGNOSIS — R94.4 NONSPECIFIC ABNORMAL RESULTS OF KIDNEY FUNCTION STUDY: Primary | ICD-10-CM

## 2024-12-10 LAB
ANION GAP SERPL CALC-SCNC: 8 MEQ/L
BUN SERPL-MCNC: 16 MG/DL (ref 9.8–20.1)
CALCIUM SERPL-MCNC: 9.4 MG/DL (ref 8.4–10.2)
CHLORIDE SERPL-SCNC: 108 MMOL/L (ref 98–107)
CO2 SERPL-SCNC: 28 MMOL/L (ref 23–31)
CREAT SERPL-MCNC: 0.86 MG/DL (ref 0.55–1.02)
CREAT/UREA NIT SERPL: 19
GFR SERPLBLD CREATININE-BSD FMLA CKD-EPI: >60 ML/MIN/1.73/M2
GLUCOSE SERPL-MCNC: 115 MG/DL (ref 82–115)
POTASSIUM SERPL-SCNC: 3.6 MMOL/L (ref 3.5–5.1)
SODIUM SERPL-SCNC: 144 MMOL/L (ref 136–145)

## 2024-12-10 PROCEDURE — 80048 BASIC METABOLIC PNL TOTAL CA: CPT

## 2024-12-10 PROCEDURE — 36415 COLL VENOUS BLD VENIPUNCTURE: CPT

## 2025-01-29 ENCOUNTER — LAB VISIT (OUTPATIENT)
Dept: LAB | Facility: HOSPITAL | Age: 85
End: 2025-01-29
Attending: INTERNAL MEDICINE
Payer: MEDICARE

## 2025-01-29 DIAGNOSIS — I25.10 CORONARY ATHEROSCLEROSIS OF NATIVE CORONARY ARTERY: ICD-10-CM

## 2025-01-29 DIAGNOSIS — R73.01 IMPAIRED FASTING GLUCOSE: ICD-10-CM

## 2025-01-29 DIAGNOSIS — I10 ESSENTIAL HYPERTENSION, MALIGNANT: Primary | ICD-10-CM

## 2025-01-29 LAB
ALBUMIN SERPL-MCNC: 3.6 G/DL (ref 3.4–4.8)
ALBUMIN/GLOB SERPL: 1.2 RATIO (ref 1.1–2)
ALP SERPL-CCNC: 102 UNIT/L (ref 40–150)
ALT SERPL-CCNC: 15 UNIT/L (ref 0–55)
ANION GAP SERPL CALC-SCNC: 6 MEQ/L
AST SERPL-CCNC: 18 UNIT/L (ref 5–34)
BILIRUB SERPL-MCNC: 0.5 MG/DL
BUN SERPL-MCNC: 20 MG/DL (ref 9.8–20.1)
CALCIUM SERPL-MCNC: 9.6 MG/DL (ref 8.4–10.2)
CHLORIDE SERPL-SCNC: 107 MMOL/L (ref 98–107)
CHOLEST SERPL-MCNC: 127 MG/DL
CHOLEST/HDLC SERPL: 3 {RATIO} (ref 0–5)
CO2 SERPL-SCNC: 32 MMOL/L (ref 23–31)
CREAT SERPL-MCNC: 0.86 MG/DL (ref 0.55–1.02)
CREAT/UREA NIT SERPL: 23
EST. AVERAGE GLUCOSE BLD GHB EST-MCNC: 131.2 MG/DL
GFR SERPLBLD CREATININE-BSD FMLA CKD-EPI: >60 ML/MIN/1.73/M2
GLOBULIN SER-MCNC: 3.1 GM/DL (ref 2.4–3.5)
GLUCOSE SERPL-MCNC: 103 MG/DL (ref 82–115)
HBA1C MFR BLD: 6.2 %
HDLC SERPL-MCNC: 46 MG/DL (ref 35–60)
LDLC SERPL CALC-MCNC: 58 MG/DL (ref 50–140)
POTASSIUM SERPL-SCNC: 3.7 MMOL/L (ref 3.5–5.1)
PROT SERPL-MCNC: 6.7 GM/DL (ref 5.8–7.6)
SODIUM SERPL-SCNC: 145 MMOL/L (ref 136–145)
TRIGL SERPL-MCNC: 117 MG/DL (ref 37–140)
VLDLC SERPL CALC-MCNC: 23 MG/DL

## 2025-01-29 PROCEDURE — 83036 HEMOGLOBIN GLYCOSYLATED A1C: CPT

## 2025-01-29 PROCEDURE — 80053 COMPREHEN METABOLIC PANEL: CPT

## 2025-01-29 PROCEDURE — 80061 LIPID PANEL: CPT

## 2025-01-29 PROCEDURE — 36415 COLL VENOUS BLD VENIPUNCTURE: CPT

## 2025-03-19 ENCOUNTER — HOSPITAL ENCOUNTER (EMERGENCY)
Facility: HOSPITAL | Age: 85
Discharge: LEFT BEFORE DISCHARGE | End: 2025-03-19
Attending: STUDENT IN AN ORGANIZED HEALTH CARE EDUCATION/TRAINING PROGRAM
Payer: MEDICARE

## 2025-03-19 VITALS
OXYGEN SATURATION: 94 % | TEMPERATURE: 97 F | HEART RATE: 68 BPM | SYSTOLIC BLOOD PRESSURE: 140 MMHG | WEIGHT: 205 LBS | HEIGHT: 69 IN | RESPIRATION RATE: 20 BRPM | BODY MASS INDEX: 30.36 KG/M2 | DIASTOLIC BLOOD PRESSURE: 65 MMHG

## 2025-03-19 DIAGNOSIS — M79.89 LEG SWELLING: Primary | ICD-10-CM

## 2025-03-19 PROCEDURE — 99900041 HC LEFT WITHOUT BEING SEEN- EMERGENCY

## 2025-03-19 RX ORDER — FUROSEMIDE 20 MG/1
TABLET ORAL
COMMUNITY
Start: 2025-03-17

## 2025-03-19 RX ORDER — APIXABAN 5 MG/1
5 TABLET, FILM COATED ORAL 2 TIMES DAILY
COMMUNITY
Start: 2025-02-15

## 2025-03-19 NOTE — ED NOTES
Patient in room with family when Dr. Arthur's office returned phone call.  Pt took the phone call and was told by [facility] staff that she needed more then one dose of the lasix and to continue to take the medication, elevate the legs, and do not eat anything with salt in it.  Pt verbalized understanding and then wanted to leave without being seen by the doctor.  Nurse encouraged pt to wait and see the doctor, but pt did not want to see the Doctor.  Notified Dr. Metz that pt had spoken to her cardiologist and was leaving.  Pt left with steady gait with family.

## 2025-03-19 NOTE — ED PROVIDER NOTES
Encounter Date: 3/19/2025       History     Chief Complaint   Patient presents with    Leg Swelling     Bilateral lower leg swelling not getting better.      Patient is an 84-year-old female who presented to the ER today for leg swelling.  Patient was triaged for leg swelling and roomed.  Prior to my arrival patient's cardiologist called patient and made recommendations.  Patient refused to be seen by provider and left within 2 minutes of being roomed.  This patient was not seen by provider and a physical exam was not performed.      Review of patient's allergies indicates:  No Known Allergies  Past Medical History:   Diagnosis Date    Chronic lower back pain     CKD (chronic kidney disease) stage 3, GFR 30-59 ml/min     Coronary arteriosclerosis     Family history of breast cancer in mother     GERD (gastroesophageal reflux disease)     History of total knee arthroplasty     Hyperlipidemia     Hypertension     Impaired gait and mobility     Neuropathy, peripheral     Osteoarthritis of left knee     Osteoarthritis of right hip     Osteopenia     Overactive bladder     Personal history of breast cancer     Postmenopausal     Prediabetes     Stroke     Trochanteric bursitis, right hip      Past Surgical History:   Procedure Laterality Date    APPENDECTOMY  1968    BLADDER SUSPENSION       SECTION       SECTION      COLONOSCOPY  2017    Dr Wilber Short    HYSTERECTOMY  1968    OPEN REDUCTION AND INTERNAL FIXATION (ORIF) OF FRACTURE OF RADIUS  2006    TONSILLECTOMY  1943    TOTAL KNEE ARTHROPLASTY  2021    Dr Angelito Bowman     Family History   Problem Relation Name Age of Onset    Heart attack Mother      Breast cancer Mother      Hypertension Mother      Lung cancer Father          cause of death    Hypertension Sister      Hypertension Sister      Prostate cancer Brother       Social History[1]      Physical Exam     Initial Vitals [25 1442]   BP Pulse Resp Temp SpO2   (!)  140/65 68 20 97.3 °F (36.3 °C) (!) 94 %      MAP       --             ED Course     Labs Reviewed - No data to display       Imaging Results    None          Medications - No data to display  Medical Decision Making  Patient is an 84-year-old female who presented to the ER today for leg swelling.  Patient was triaged for leg swelling and roomed.  Prior to my arrival patient's cardiologist called patient and made recommendations.  Patient refused to be seen by provider and left within 2 minutes of being roomed.  This patient was not seen by provider and a physical exam was not performed.                                      Clinical Impression:  Final diagnoses:  [M79.89] Leg swelling (Primary)          ED Disposition Condition    [facility] after [facility]                   [1]   Social History  Tobacco Use    Smoking status: Never    Smokeless tobacco: Never   Substance Use Topics    Alcohol use: Never    Drug use: Never        Todd Metz MD  03/19/25 9746

## 2025-04-15 ENCOUNTER — LAB VISIT (OUTPATIENT)
Dept: LAB | Facility: HOSPITAL | Age: 85
End: 2025-04-15
Attending: INTERNAL MEDICINE
Payer: MEDICARE

## 2025-04-15 DIAGNOSIS — I48.91 ATRIAL FIBRILLATION: Primary | ICD-10-CM

## 2025-04-15 DIAGNOSIS — I10 ESSENTIAL HYPERTENSION, MALIGNANT: ICD-10-CM

## 2025-04-15 DIAGNOSIS — Z79.899 POLYPHARMACY: ICD-10-CM

## 2025-04-15 LAB
ANION GAP SERPL CALC-SCNC: 8 MEQ/L
BNP BLD-MCNC: 174.9 PG/ML
BUN SERPL-MCNC: 21 MG/DL (ref 9.8–20.1)
CALCIUM SERPL-MCNC: 9.3 MG/DL (ref 8.4–10.2)
CHLORIDE SERPL-SCNC: 107 MMOL/L (ref 98–107)
CO2 SERPL-SCNC: 29 MMOL/L (ref 23–31)
CREAT SERPL-MCNC: 0.87 MG/DL (ref 0.55–1.02)
CREAT/UREA NIT SERPL: 24
GFR SERPLBLD CREATININE-BSD FMLA CKD-EPI: >60 ML/MIN/1.73/M2
GLUCOSE SERPL-MCNC: 117 MG/DL (ref 82–115)
POTASSIUM SERPL-SCNC: 3.9 MMOL/L (ref 3.5–5.1)
SODIUM SERPL-SCNC: 144 MMOL/L (ref 136–145)

## 2025-04-15 PROCEDURE — 83880 ASSAY OF NATRIURETIC PEPTIDE: CPT

## 2025-04-15 PROCEDURE — 36415 COLL VENOUS BLD VENIPUNCTURE: CPT

## 2025-04-15 PROCEDURE — 80048 BASIC METABOLIC PNL TOTAL CA: CPT

## 2025-05-05 ENCOUNTER — LAB VISIT (OUTPATIENT)
Dept: LAB | Facility: HOSPITAL | Age: 85
End: 2025-05-05
Attending: INTERNAL MEDICINE
Payer: MEDICARE

## 2025-05-05 DIAGNOSIS — I10 ESSENTIAL HYPERTENSION, MALIGNANT: ICD-10-CM

## 2025-05-05 DIAGNOSIS — R00.2 PALPITATIONS: Primary | ICD-10-CM

## 2025-05-05 LAB
ANION GAP SERPL CALC-SCNC: 8 MEQ/L
BUN SERPL-MCNC: 23 MG/DL (ref 9.8–20.1)
CALCIUM SERPL-MCNC: 10.5 MG/DL (ref 8.4–10.2)
CHLORIDE SERPL-SCNC: 107 MMOL/L (ref 98–107)
CO2 SERPL-SCNC: 31 MMOL/L (ref 23–31)
CREAT SERPL-MCNC: 0.93 MG/DL (ref 0.55–1.02)
CREAT/UREA NIT SERPL: 25
GFR SERPLBLD CREATININE-BSD FMLA CKD-EPI: 60 ML/MIN/1.73/M2
GLUCOSE SERPL-MCNC: 113 MG/DL (ref 82–115)
POTASSIUM SERPL-SCNC: 4.3 MMOL/L (ref 3.5–5.1)
SODIUM SERPL-SCNC: 146 MMOL/L (ref 136–145)

## 2025-05-05 PROCEDURE — 36415 COLL VENOUS BLD VENIPUNCTURE: CPT

## 2025-05-05 PROCEDURE — 80048 BASIC METABOLIC PNL TOTAL CA: CPT

## 2025-06-19 ENCOUNTER — LAB VISIT (OUTPATIENT)
Dept: LAB | Facility: HOSPITAL | Age: 85
End: 2025-06-19
Attending: INTERNAL MEDICINE
Payer: MEDICARE

## 2025-06-19 DIAGNOSIS — I10 HYPERTENSION, UNSPECIFIED TYPE: Primary | ICD-10-CM

## 2025-06-19 LAB
ALBUMIN SERPL-MCNC: 3.4 G/DL (ref 3.4–4.8)
ALBUMIN/GLOB SERPL: 1.1 RATIO (ref 1.1–2)
ALP SERPL-CCNC: 106 UNIT/L (ref 40–150)
ALT SERPL-CCNC: 15 UNIT/L (ref 0–55)
ANION GAP SERPL CALC-SCNC: 8 MEQ/L
AST SERPL-CCNC: 18 UNIT/L (ref 11–45)
BILIRUB SERPL-MCNC: 0.7 MG/DL
BUN SERPL-MCNC: 18 MG/DL (ref 9.8–20.1)
CALCIUM SERPL-MCNC: 9.6 MG/DL (ref 8.4–10.2)
CHLORIDE SERPL-SCNC: 107 MMOL/L (ref 98–107)
CO2 SERPL-SCNC: 30 MMOL/L (ref 23–31)
CREAT SERPL-MCNC: 0.93 MG/DL (ref 0.55–1.02)
CREAT/UREA NIT SERPL: 19
GFR SERPLBLD CREATININE-BSD FMLA CKD-EPI: 60 ML/MIN/1.73/M2
GLOBULIN SER-MCNC: 3.1 GM/DL (ref 2.4–3.5)
GLUCOSE SERPL-MCNC: 105 MG/DL (ref 82–115)
POTASSIUM SERPL-SCNC: 3.7 MMOL/L (ref 3.5–5.1)
PROT SERPL-MCNC: 6.5 GM/DL (ref 5.8–7.6)
SODIUM SERPL-SCNC: 145 MMOL/L (ref 136–145)

## 2025-06-19 PROCEDURE — 80053 COMPREHEN METABOLIC PANEL: CPT

## 2025-06-19 PROCEDURE — 36415 COLL VENOUS BLD VENIPUNCTURE: CPT

## 2025-07-30 ENCOUNTER — LAB VISIT (OUTPATIENT)
Dept: LAB | Facility: HOSPITAL | Age: 85
End: 2025-07-30
Attending: INTERNAL MEDICINE
Payer: MEDICARE

## 2025-07-30 DIAGNOSIS — I10 HYPERTENSION, UNSPECIFIED TYPE: Primary | ICD-10-CM

## 2025-07-30 DIAGNOSIS — R73.01 IMPAIRED FASTING GLUCOSE: ICD-10-CM

## 2025-07-30 DIAGNOSIS — E78.2 MIXED HYPERLIPIDEMIA: ICD-10-CM

## 2025-07-30 LAB
ALBUMIN SERPL-MCNC: 3.3 G/DL (ref 3.4–4.8)
ALBUMIN/GLOB SERPL: 1.1 RATIO (ref 1.1–2)
ALP SERPL-CCNC: 107 UNIT/L (ref 40–150)
ALT SERPL-CCNC: 10 UNIT/L (ref 0–55)
ANION GAP SERPL CALC-SCNC: 7 MEQ/L
AST SERPL-CCNC: 14 UNIT/L (ref 11–45)
BASOPHILS # BLD AUTO: 0.06 X10(3)/MCL
BASOPHILS NFR BLD AUTO: 1 %
BILIRUB SERPL-MCNC: 0.8 MG/DL
BUN SERPL-MCNC: 20 MG/DL (ref 9.8–20.1)
CALCIUM SERPL-MCNC: 9.1 MG/DL (ref 8.4–10.2)
CHLORIDE SERPL-SCNC: 107 MMOL/L (ref 98–107)
CHOLEST SERPL-MCNC: 115 MG/DL
CHOLEST/HDLC SERPL: 3 {RATIO} (ref 0–5)
CO2 SERPL-SCNC: 31 MMOL/L (ref 23–31)
CREAT SERPL-MCNC: 0.91 MG/DL (ref 0.55–1.02)
CREAT/UREA NIT SERPL: 22
EOSINOPHIL # BLD AUTO: 0.19 X10(3)/MCL (ref 0–0.9)
EOSINOPHIL NFR BLD AUTO: 3.2 %
ERYTHROCYTE [DISTWIDTH] IN BLOOD BY AUTOMATED COUNT: 15.7 % (ref 11.5–17)
EST. AVERAGE GLUCOSE BLD GHB EST-MCNC: 137 MG/DL
GFR SERPLBLD CREATININE-BSD FMLA CKD-EPI: >60 ML/MIN/1.73/M2
GLOBULIN SER-MCNC: 3.1 GM/DL (ref 2.4–3.5)
GLUCOSE SERPL-MCNC: 116 MG/DL (ref 82–115)
HBA1C MFR BLD: 6.4 %
HCT VFR BLD AUTO: 38 % (ref 37–47)
HDLC SERPL-MCNC: 43 MG/DL (ref 35–60)
HGB BLD-MCNC: 12 G/DL (ref 12–16)
IMM GRANULOCYTES # BLD AUTO: 0.02 X10(3)/MCL (ref 0–0.04)
IMM GRANULOCYTES NFR BLD AUTO: 0.3 %
LDLC SERPL CALC-MCNC: 57 MG/DL (ref 50–140)
LYMPHOCYTES # BLD AUTO: 1.14 X10(3)/MCL (ref 0.6–4.6)
LYMPHOCYTES NFR BLD AUTO: 19.3 %
MCH RBC QN AUTO: 28.5 PG (ref 27–31)
MCHC RBC AUTO-ENTMCNC: 31.6 G/DL (ref 33–36)
MCV RBC AUTO: 90.3 FL (ref 80–94)
MICROALBUMIN UR-MCNC: 14.8 UG/ML
MONOCYTES # BLD AUTO: 0.6 X10(3)/MCL (ref 0.1–1.3)
MONOCYTES NFR BLD AUTO: 10.2 %
NEUTROPHILS # BLD AUTO: 3.9 X10(3)/MCL (ref 2.1–9.2)
NEUTROPHILS NFR BLD AUTO: 66 %
NRBC BLD AUTO-RTO: 0 %
PLATELET # BLD AUTO: 173 X10(3)/MCL (ref 130–400)
PMV BLD AUTO: 11.3 FL (ref 7.4–10.4)
POTASSIUM SERPL-SCNC: 3.7 MMOL/L (ref 3.5–5.1)
PROT SERPL-MCNC: 6.4 GM/DL (ref 5.8–7.6)
RBC # BLD AUTO: 4.21 X10(6)/MCL (ref 4.2–5.4)
SODIUM SERPL-SCNC: 145 MMOL/L (ref 136–145)
T3FREE SERPL-MCNC: 2.65 PG/ML (ref 1.58–3.91)
T4 FREE SERPL-MCNC: 1.09 NG/DL (ref 0.7–1.48)
TRIGL SERPL-MCNC: 75 MG/DL (ref 37–140)
TSH SERPL-ACNC: 1.29 UIU/ML (ref 0.35–4.94)
VLDLC SERPL CALC-MCNC: 15 MG/DL
WBC # BLD AUTO: 5.91 X10(3)/MCL (ref 4.5–11.5)

## 2025-07-30 PROCEDURE — 82043 UR ALBUMIN QUANTITATIVE: CPT

## 2025-07-30 PROCEDURE — 80061 LIPID PANEL: CPT

## 2025-07-30 PROCEDURE — 80053 COMPREHEN METABOLIC PANEL: CPT

## 2025-07-30 PROCEDURE — 84481 FREE ASSAY (FT-3): CPT

## 2025-07-30 PROCEDURE — 84439 ASSAY OF FREE THYROXINE: CPT

## 2025-07-30 PROCEDURE — 84443 ASSAY THYROID STIM HORMONE: CPT

## 2025-07-30 PROCEDURE — 85025 COMPLETE CBC W/AUTO DIFF WBC: CPT

## 2025-07-30 PROCEDURE — 83036 HEMOGLOBIN GLYCOSYLATED A1C: CPT

## 2025-07-30 PROCEDURE — 36415 COLL VENOUS BLD VENIPUNCTURE: CPT
